# Patient Record
Sex: FEMALE | Race: WHITE | Employment: FULL TIME | URBAN - METROPOLITAN AREA
[De-identification: names, ages, dates, MRNs, and addresses within clinical notes are randomized per-mention and may not be internally consistent; named-entity substitution may affect disease eponyms.]

---

## 2017-02-17 ENCOUNTER — TELEPHONE (OUTPATIENT)
Dept: INTERNAL MEDICINE CLINIC | Age: 62
End: 2017-02-17

## 2017-02-23 RX ORDER — HYDROGEN PEROXIDE 3 %
20 SOLUTION, NON-ORAL MISCELLANEOUS DAILY
Qty: 90 CAP | Refills: 0 | Status: SHIPPED | OUTPATIENT
Start: 2017-02-23 | End: 2017-03-30 | Stop reason: SDUPTHER

## 2017-03-09 ENCOUNTER — OFFICE VISIT (OUTPATIENT)
Dept: INTERNAL MEDICINE CLINIC | Age: 62
End: 2017-03-09

## 2017-03-09 VITALS
OXYGEN SATURATION: 97 % | BODY MASS INDEX: 33.47 KG/M2 | HEART RATE: 67 BPM | SYSTOLIC BLOOD PRESSURE: 129 MMHG | HEIGHT: 69 IN | WEIGHT: 226 LBS | DIASTOLIC BLOOD PRESSURE: 76 MMHG | RESPIRATION RATE: 18 BRPM | TEMPERATURE: 97.8 F

## 2017-03-09 DIAGNOSIS — E55.9 VITAMIN D DEFICIENCY: ICD-10-CM

## 2017-03-09 DIAGNOSIS — B00.1 HERPES LABIALIS: ICD-10-CM

## 2017-03-09 DIAGNOSIS — E78.2 MIXED HYPERLIPIDEMIA: ICD-10-CM

## 2017-03-09 DIAGNOSIS — K21.9 GASTROESOPHAGEAL REFLUX DISEASE WITHOUT ESOPHAGITIS: ICD-10-CM

## 2017-03-09 DIAGNOSIS — Z00.00 WELLNESS EXAMINATION: Primary | ICD-10-CM

## 2017-03-09 RX ORDER — VALACYCLOVIR HYDROCHLORIDE 1 G/1
1000 TABLET, FILM COATED ORAL 2 TIMES DAILY
Qty: 2 TAB | Refills: 5 | Status: SHIPPED | OUTPATIENT
Start: 2017-03-09 | End: 2017-03-09 | Stop reason: ALTCHOICE

## 2017-03-09 RX ORDER — VALACYCLOVIR HYDROCHLORIDE 1 G/1
2000 TABLET, FILM COATED ORAL 2 TIMES DAILY
Qty: 4 TAB | Refills: 6 | Status: SHIPPED | OUTPATIENT
Start: 2017-03-09 | End: 2017-03-10

## 2017-03-09 RX ORDER — ROSUVASTATIN CALCIUM 10 MG/1
10 TABLET, COATED ORAL
Qty: 90 TAB | Refills: 2 | Status: SHIPPED | OUTPATIENT
Start: 2017-03-09 | End: 2017-10-12 | Stop reason: SDUPTHER

## 2017-03-09 NOTE — MR AVS SNAPSHOT
Visit Information Date & Time Provider Department Dept. Phone Encounter #  
 3/9/2017  3:30 PM Clementine Fry MD [facility] [address] [id number] Follow-up Instructions Return in about 6 months (around 9/9/2017). Upcoming Health Maintenance Date Due Hepatitis C Screening 1955 PAP AKA CERVICAL CYTOLOGY 5/22/1976 INFLUENZA AGE 9 TO ADULT 8/1/2016 BREAST CANCER SCRN MAMMOGRAM 12/21/2018 COLONOSCOPY 11/15/2020 DTaP/Tdap/Td series (2 - Td) 6/2/2021 Allergies as of 3/9/2017  Review Complete On: 3/9/2017 By: Remi Kendrick LPN No Known Allergies Current Immunizations  Never Reviewed Name Date Influenza Vaccine 10/1/2015 TDAP Vaccine 6/2/2011 Zoster Vaccine, Live 2/3/2016 Not reviewed this visit You Were Diagnosed With   
  
 Codes Comments Wellness examination    -  Primary ICD-10-CM: Z00.00 ICD-9-CM: V70.0 Mixed hyperlipidemia     ICD-10-CM: E78.2 ICD-9-CM: 272.2 Herpes labialis     ICD-10-CM: B00.1 ICD-9-CM: 054.9 Vitamin D deficiency     ICD-10-CM: E55.9 ICD-9-CM: 268.9 Gastroesophageal reflux disease without esophagitis     ICD-10-CM: K21.9 ICD-9-CM: 530.81 Vitals BP Pulse Temp Resp Height(growth percentile) Weight(growth percentile) 129/76 67 97.8 °F (36.6 °C) (Oral) 18 5' 9\" (1.753 m) 226 lb (102.5 kg) SpO2 BMI OB Status Smoking Status 97% 33.37 kg/m2 Premenopausal Never Smoker Vitals History BMI and BSA Data Body Mass Index Body Surface Area  
 33.37 kg/m 2 2.23 m 2 Preferred Pharmacy Pharmacy Name Phone [facility], [address] [phone] Your Updated Medication List  
  
   
This list is accurate as of: 3/9/17  4:12 PM.  Always use your most recent med list.  
  
  
  
  
 aspirin 81 mg tablet Take  by mouth. esomeprazole 20 mg capsule Commonly known as:  NexIUM Take 1 Cap by mouth daily. Appointment needed for additional refills FLONASE 50 mcg/actuation nasal spray Generic drug:  fluticasone 2 Sprays by Both Nostrils route daily. GLUCOSAMINE PO Take  by mouth daily. loratadine 10 mg tablet Commonly known as:  Reyes Olvin Take 10 mg by mouth. MULTIVITAMIN PO Take  by mouth. psyllium packet Commonly known as:  METAMUCIL Take 1 Packet by mouth daily. rosuvastatin 10 mg tablet Commonly known as:  CRESTOR Take 1 Tab by mouth nightly. valACYclovir 1 gram tablet Commonly known as:  VALTREX Take 2 Tabs by mouth two (2) times a day for 1 day. VITAMIN D3 1,000 unit tablet Generic drug:  cholecalciferol Take  by mouth daily. Prescriptions Sent to Pharmacy Refills  
 rosuvastatin (CRESTOR) 10 mg tablet 2 Sig: Take 1 Tab by mouth nightly. Class: Normal  
 Pharmacy: [place] [id number] Ph #: [phone] Route: Oral  
 valACYclovir (VALTREX) 1 gram tablet 6 Sig: Take 2 Tabs by mouth two (2) times a day for 1 day. Class: Normal  
 Pharmacy: [place] [id number] Ph #: [phone] Route: Oral  
  
Follow-up Instructions Return in about 6 months (around 9/9/2017). Introducing [facility] & [facility] SERVICES! Dear Kathy Moses: Thank you for requesting a [employer] account. Our records indicate that you already have an active [employer]Fin account. You can access your account anytime at [email or web address] Did you know that you can access your hospital and ER discharge instructions at any time in [employer]Fin? You can also review all of your test results from your hospital stay or ER visit. Additional Information If you have questions, please visit the Frequently Asked Questions section of the [employer] website at [email or web address] Remember, [facility] is NOT to be used for urgent needs. For medical emergencies, dial 911. Now available from your iPhone and Android! Please provide this summary of care documentation to your next provider. Your primary care clinician is listed as [facility]. If you have any questions after today's visit, please call [phone].

## 2017-03-09 NOTE — PROGRESS NOTES
Dominguez Tang is a 64 y.o. female who presents today for Medication Evaluation (f/u for Crestor)  . She has a history of   Patient Active Problem List   Diagnosis Code    Vitamin D deficiency E55.9    HLD (hyperlipidemia) E78.5    Family history of bicuspid aortic valve Z82.79    Gastroesophageal reflux disease without esophagitis K21.9   . Today patient is here for follow up. she does not have other concerns. Hyperlipidemia - Stable. Will repeat this. Weight up a few #'s. Currently she takes 10 mg of Crestor. ROS: taking medications as instructed, no medication side effects noted  No new myalgias, no joint pains, no weakness  No TIA's, no chest pain on exertion, no dyspnea on exertion, no swelling of ankles. Lab Results   Component Value Date/Time    Cholesterol, total 205 03/31/2016 08:38 AM    HDL Cholesterol 51 03/31/2016 08:38 AM    LDL,Direct 206 05/02/2015    LDL, calculated 120 03/31/2016 08:38 AM    VLDL, calculated 34 03/31/2016 08:38 AM    Triglyceride 170 03/31/2016 08:38 AM    Triglycerides 361 05/02/2015     GERD: Doing well with lower dose Nexium. Health maintenance hx includes:  Exercise: moderately active. Form of exercise: walking. 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 Screening:    Colon cancer screening:  Last Colonoscopy: 2010 and   was normal   Breast cancer screening: last mammogram 2016 and   was normal   Cervical cancer screening: last PAP/Pelvic exam: 2016   and was normal.   Osteoporosis screening:  N/A      Immunizations:     Immunization History   Administered Date(s) Administered    Influenza Vaccine 10/01/2015    TDAP Vaccine 06/02/2011    Zoster Vaccine, Live 02/03/2016      Immunization status: up to date and documented. ROS  Review of Systems   Constitutional: Negative for chills, fever and weight loss. HENT: Negative for congestion and sore throat.     Eyes: Negative for blurred vision, double vision and photophobia. Respiratory: Negative for cough and shortness of breath. Cardiovascular: Negative for chest pain, palpitations and leg swelling. Gastrointestinal: Positive for constipation and diarrhea. Negative for abdominal pain, blood in stool, heartburn, melena, nausea and vomiting. Genitourinary: Negative for dysuria, frequency and urgency. Musculoskeletal: Negative for joint pain and myalgias. Skin: Negative for rash. Neurological: Negative. Negative for headaches. Endo/Heme/Allergies: Does not bruise/bleed easily. Psychiatric/Behavioral: Negative for memory loss and suicidal ideas. Visit Vitals    /76    Pulse 67    Temp 97.8 °F (36.6 °C) (Oral)    Resp 18    Ht 5' 9\" (1.753 m)    Wt 226 lb (102.5 kg)    SpO2 97%    BMI 33.37 kg/m2       Physical Exam   Constitutional: She is oriented to person, place, and time. She appears well-developed and well-nourished. No distress. HENT:   Head: Normocephalic and atraumatic. Neck: Normal range of motion. Neck supple. No thyromegaly present. Cardiovascular: Normal rate and regular rhythm. No murmur heard. Pulmonary/Chest: Effort normal and breath sounds normal. She has no wheezes. Abdominal: Soft. Bowel sounds are normal. She exhibits no distension. Musculoskeletal: Normal range of motion. She exhibits no edema. Neurological: She is alert and oriented to person, place, and time. No cranial nerve deficit. Skin: Skin is warm and dry. Psychiatric: She has a normal mood and affect. Her behavior is normal.         Current Outpatient Prescriptions   Medication Sig    psyllium (METAMUCIL) packet Take 1 Packet by mouth daily.  rosuvastatin (CRESTOR) 10 mg tablet Take 1 Tab by mouth nightly.  valACYclovir (VALTREX) 1 gram tablet Take 2 Tabs by mouth two (2) times a day for 1 day.  esomeprazole (NEXIUM) 20 mg capsule Take 1 Cap by mouth daily.  Appointment needed for additional refills    loratadine (CLARITIN) 10 mg tablet Take 10 mg by mouth.  cholecalciferol, vitamin d3, (VITAMIN D) 1,000 unit tablet Take  by mouth daily.  aspirin 81 mg tablet Take  by mouth.  GLUCOSAMINE SULFATE (GLUCOSAMINE PO) Take  by mouth daily.  MULTIVITAMIN PO Take  by mouth.  fluticasone (FLONASE) 50 mcg/actuation nasal spray 2 Sprays by Both Nostrils route daily. No current facility-administered medications for this visit. Past Medical History:   Diagnosis Date    AR (allergic rhinitis)     HLD (hyperlipidemia)     Post-menopausal AGE 48    Schatzki's ring     Vitamin D deficiency       Past Surgical History:   Procedure Laterality Date    COLONOSCOPY  11/15/10    small EH, repeat 10 years; dr Maritza Juárez EGD  11/15/10    stricture ge junction (dilated), small hh       Social History   Substance Use Topics    Smoking status: Never Smoker    Smokeless tobacco: Never Used    Alcohol use 3.5 oz/week     7 Glasses of wine per week      Family History   Problem Relation Age of Onset    Diabetes Mother     Elevated Lipids Mother     Hypertension Mother    [address] Elevated Lipids Sister     Hypertension Brother     Hypertension Maternal Grandfather     Heart Disease Maternal Grandfather     Cancer Paternal Grandmother      stomach    Alzheimer Father     Alzheimer Maternal Grandmother         No Known Allergies     Assessment/Plan  Christelle Gonzalez was seen today for medication evaluation. Diagnoses and all orders for this visit:    Wellness examination -  UTD. Pt gets GYN care elsewhere. Discussed working some weight loss. Continue exercise. Michele Medina was counseled on age-appropriate/ guideline-based risk prevention behaviors and screening for a 64y.o. year old   female . We also discussed adjustments in screening based on family history if necessary.    Printed instructions for preventative screening guidelines and healthy behaviors given to patient with after visit summary. Mixed hyperlipidemia - refill  -     rosuvastatin (CRESTOR) 10 mg tablet; Take 1 Tab by mouth nightly. Herpes labialis - cold sores. Start valtrex 2g BIX for 2 doses at onset. -     Discontinue: valACYclovir (VALTREX) 1 gram tablet; Take 1 Tab by mouth two (2) times a day for 1 day. -     valACYclovir (VALTREX) 1 gram tablet; Take 2 Tabs by mouth two (2) times a day for 1 day. Vitamin D deficiency - Repeat     Gastroesophageal reflux disease without esophagitis - Stable. SOme abdomnial discomfort still. Discussed fiber and probiotics. Follow-up Disposition:  Return in about 6 months (around 9/9/2017).     Calixto Simmons MD  3/9/2017

## 2017-03-30 ENCOUNTER — OFFICE VISIT (OUTPATIENT)
Dept: INTERNAL MEDICINE CLINIC | Age: 62
End: 2017-03-30

## 2017-03-30 VITALS
DIASTOLIC BLOOD PRESSURE: 85 MMHG | BODY MASS INDEX: 34.21 KG/M2 | SYSTOLIC BLOOD PRESSURE: 138 MMHG | TEMPERATURE: 98.1 F | OXYGEN SATURATION: 97 % | HEIGHT: 69 IN | RESPIRATION RATE: 18 BRPM | HEART RATE: 67 BPM | WEIGHT: 231 LBS

## 2017-03-30 DIAGNOSIS — H92.01 EAR PAIN, RIGHT: Primary | ICD-10-CM

## 2017-03-30 DIAGNOSIS — K21.9 GASTROESOPHAGEAL REFLUX DISEASE WITHOUT ESOPHAGITIS: ICD-10-CM

## 2017-03-30 RX ORDER — HYDROGEN PEROXIDE 3 %
20 SOLUTION, NON-ORAL MISCELLANEOUS DAILY
Qty: 90 CAP | Refills: 1 | Status: SHIPPED | OUTPATIENT
Start: 2017-03-30 | End: 2017-10-12 | Stop reason: SDUPTHER

## 2017-03-30 RX ORDER — LORATADINE AND PSEUDOEPHEDRINE 10; 240 MG/1; MG/1
1 TABLET, EXTENDED RELEASE ORAL DAILY
Qty: 7 TAB | Refills: 0 | Status: SHIPPED | OUTPATIENT
Start: 2017-03-30 | End: 2017-04-06

## 2017-03-30 NOTE — PROGRESS NOTES
Conner Bravo is a 64 y.o. female who presents today for Ear Pain (rt ear; x 4 days)  . She has a history of   Patient Active Problem List   Diagnosis Code    Vitamin D deficiency E55.9    HLD (hyperlipidemia) E78.5    Family history of bicuspid aortic valve Z82.79    Gastroesophageal reflux disease without esophagitis K21.9   . Today patient is here for an acute visit. Upper respiratory illness:  Conner Bravo presents with complaints of R ear pain and mild throat pain for 4 days. no nausea and no vomiting . Seemed to be getting better, but waking her up at night. Symptoms are moderate. Over-the-counter remedies including None   No changes in hearing. Some mild allergies. No s/s of URI. ROS  Review of Systems   Constitutional: Negative for chills, fever and weight loss. HENT: Positive for ear pain. Negative for congestion, ear discharge, hearing loss, nosebleeds, sore throat and tinnitus. Eyes: Negative for blurred vision, double vision and photophobia. Respiratory: Negative for cough, shortness of breath and stridor. Cardiovascular: Negative for chest pain, palpitations, orthopnea, claudication and leg swelling. Gastrointestinal: Negative for abdominal pain, constipation, diarrhea, heartburn, nausea and vomiting. Genitourinary: Negative for dysuria, frequency and urgency. Musculoskeletal: Negative for joint pain and myalgias. Skin: Negative for itching and rash. Neurological: Negative. Negative for headaches. Endo/Heme/Allergies: Does not bruise/bleed easily. Psychiatric/Behavioral: Negative for memory loss and suicidal ideas. Visit Vitals    /85    Pulse 67    Temp 98.1 °F (36.7 °C) (Oral)    Resp 18    Ht 5' 9\" (1.753 m)    Wt 231 lb (104.8 kg)    SpO2 97%    BMI 34.11 kg/m2       Physical Exam   Constitutional: She appears well-developed and well-nourished. HENT:   Head: Normocephalic and atraumatic.    Right Ear: External ear normal. Left Ear: External ear normal.   Mouth/Throat: Oropharynx is clear and moist. No oropharyngeal exudate. Fluid behind R TM, no pus. Ear canal normal   Cardiovascular: Normal rate and regular rhythm. Pulmonary/Chest: Effort normal. No respiratory distress. Abdominal: Soft. Bowel sounds are normal. She exhibits no distension. No umbilical hernia noted   Lymphadenopathy:     She has no cervical adenopathy. Skin: Skin is warm and dry. Psychiatric: She has a normal mood and affect. Her behavior is normal.         Current Outpatient Prescriptions   Medication Sig    loratadine-pseudoephedrine (CLARITIN-D 24 HOUR)  mg per tablet Take 1 Tab by mouth daily for 7 days.  esomeprazole (NEXIUM) 20 mg capsule Take 1 Cap by mouth daily. Appointment needed for additional refills    psyllium (METAMUCIL) packet Take 1 Packet by mouth daily.  rosuvastatin (CRESTOR) 10 mg tablet Take 1 Tab by mouth nightly.  loratadine (CLARITIN) 10 mg tablet Take 10 mg by mouth.  cholecalciferol, vitamin d3, (VITAMIN D) 1,000 unit tablet Take  by mouth daily.  aspirin 81 mg tablet Take  by mouth.  GLUCOSAMINE SULFATE (GLUCOSAMINE PO) Take  by mouth daily.  MULTIVITAMIN PO Take  by mouth.  fluticasone (FLONASE) 50 mcg/actuation nasal spray 2 Sprays by Both Nostrils route daily. No current facility-administered medications for this visit.          Past Medical History:   Diagnosis Date    AR (allergic rhinitis)     HLD (hyperlipidemia)     Post-menopausal AGE 48    Schatzki's ring     Vitamin D deficiency       Past Surgical History:   Procedure Laterality Date    COLONOSCOPY  11/15/10    small EH, repeat 10 years; dr Maritza Juárez EGD  11/15/10    stricture ge junction (dilated), small hh       Social History   Substance Use Topics    Smoking status: Never Smoker    Smokeless tobacco: Never Used    Alcohol use 3.5 oz/week     7 Glasses of wine per week      Family History   Problem Relation Age of Onset    Diabetes Mother     Elevated Lipids Mother     Hypertension Mother    Aundria Dadds Elevated Lipids Sister     Hypertension Brother     Hypertension Maternal Grandfather     Heart Disease Maternal Grandfather     Cancer Paternal Grandmother      stomach    Alzheimer Father     Alzheimer Maternal Grandmother         No Known Allergies     Assessment/Plan  Omar Mar was seen today for ear pain. Diagnoses and all orders for this visit:    Ear pain, right - + fluid, no pus. Try decongestant for several days. If no better will treat with amoxicillin. -     loratadine-pseudoephedrine (CLARITIN-D 24 HOUR)  mg per tablet; Take 1 Tab by mouth daily for 7 days. Gastroesophageal reflux disease without esophagitis - refill  -     esomeprazole (NEXIUM) 20 mg capsule; Take 1 Cap by mouth daily. Appointment needed for additional refills  Do not feel an umbilical hernia.        Follow-up Disposition: Not on File    Wendi Escoto MD  3/30/2017

## 2017-03-30 NOTE — MR AVS SNAPSHOT
Visit Information Date & Time Provider Department Dept. Phone Encounter #  
 3/30/2017 11:30 AM Fritz Cortes MD [facility] [address] [id number] Upcoming Health Maintenance Date Due Hepatitis C Screening 1955 PAP AKA CERVICAL CYTOLOGY 5/22/1976 INFLUENZA AGE 9 TO ADULT 8/1/2016 BREAST CANCER SCRN MAMMOGRAM 12/21/2018 COLONOSCOPY 11/15/2020 DTaP/Tdap/Td series (2 - Td) 6/2/2021 Allergies as of 3/30/2017  Review Complete On: 3/9/2017 By: Elizabeth Esposito LPN No Known Allergies Current Immunizations  Never Reviewed Name Date Influenza Vaccine 10/1/2015 TDAP Vaccine 6/2/2011 Zoster Vaccine, Live 2/3/2016 Not reviewed this visit You Were Diagnosed With   
  
 Codes Comments Referred ear pain, right    -  Primary ICD-10-CM: H92.01 
ICD-9-CM: 388.72 Gastroesophageal reflux disease without esophagitis     ICD-10-CM: K21.9 ICD-9-CM: 530.81 Vitals BP Pulse Temp Resp Height(growth percentile) Weight(growth percentile) 138/85 67 98.1 °F (36.7 °C) (Oral) 18 5' 9\" (1.753 m) 231 lb (104.8 kg) SpO2 BMI OB Status Smoking Status 97% 34.11 kg/m2 Premenopausal Never Smoker Vitals History BMI and BSA Data Body Mass Index Body Surface Area  
 34.11 kg/m 2 2.26 m 2 Preferred Pharmacy Pharmacy Name Phone [facility], [address] [phone] Your Updated Medication List  
  
   
This list is accurate as of: 3/30/17 12:11 PM.  Always use your most recent med list.  
  
  
  
  
 aspirin 81 mg tablet Take  by mouth.  
  
 esomeprazole 20 mg capsule Commonly known as:  NexIUM Take 1 Cap by mouth daily. Appointment needed for additional refills FLONASE 50 mcg/actuation nasal spray Generic drug:  fluticasone 2 Sprays by Both Nostrils route daily.   
  
 GLUCOSAMINE PO  
 Take  by mouth daily. loratadine 10 mg tablet Commonly known as:  Diaz Tirso Take 10 mg by mouth.  
  
 loratadine-pseudoephedrine  mg per tablet Commonly known as:  CLARITIN-D 24 HOUR Take 1 Tab by mouth daily for 7 days. MULTIVITAMIN PO Take  by mouth. psyllium packet Commonly known as:  METAMUCIL Take 1 Packet by mouth daily. rosuvastatin 10 mg tablet Commonly known as:  CRESTOR Take 1 Tab by mouth nightly. VITAMIN D3 1,000 unit tablet Generic drug:  cholecalciferol Take  by mouth daily. Prescriptions Sent to Pharmacy Refills  
 loratadine-pseudoephedrine (CLARITIN-D 24 HOUR)  mg per tablet 0 Sig: Take 1 Tab by mouth daily for 7 days. Class: Normal  
 Pharmacy: [place] [address] Ph #: [phone] Route: Oral  
 esomeprazole (NEXIUM) 20 mg capsule 1 Sig: Take 1 Cap by mouth daily. Appointment needed for additional refills Class: Normal  
 Pharmacy: [place] [id number] Ph #: [phone] Route: Oral  
  
Introducing [facility] & [facility]! Dear Danielle Rm: Thank you for requesting a [email or web address] account. Our records indicate that you already have an active [email or web address] account. You can access your account anytime at [email or web address] Did you know that you can access your hospital and ER discharge instructions at any time in [email or web address]? You can also review all of your test results from your hospital stay or ER visit. Additional Information If you have questions, please visit the Frequently Asked Questions section of the [email or web address] website at [email or web address] Remember, [email or web address] is NOT to be used for urgent needs. For medical emergencies, dial 911. Now available from your iPhone and Android! Please provide this summary of care documentation to your next provider. Your primary care clinician is listed as Thom Cruz. If you have any questions after today's visit, please call [phone].

## 2017-10-12 ENCOUNTER — OFFICE VISIT (OUTPATIENT)
Dept: INTERNAL MEDICINE CLINIC | Age: 62
End: 2017-10-12

## 2017-10-12 VITALS
DIASTOLIC BLOOD PRESSURE: 89 MMHG | WEIGHT: 227 LBS | BODY MASS INDEX: 33.62 KG/M2 | SYSTOLIC BLOOD PRESSURE: 136 MMHG | TEMPERATURE: 98.2 F | HEART RATE: 70 BPM | HEIGHT: 69 IN | OXYGEN SATURATION: 96 %

## 2017-10-12 DIAGNOSIS — E78.2 MIXED HYPERLIPIDEMIA: Primary | ICD-10-CM

## 2017-10-12 DIAGNOSIS — K21.9 GASTROESOPHAGEAL REFLUX DISEASE WITHOUT ESOPHAGITIS: ICD-10-CM

## 2017-10-12 DIAGNOSIS — G56.02 CARPAL TUNNEL SYNDROME OF LEFT WRIST: ICD-10-CM

## 2017-10-12 DIAGNOSIS — E55.9 VITAMIN D DEFICIENCY: ICD-10-CM

## 2017-10-12 RX ORDER — ROSUVASTATIN CALCIUM 10 MG/1
10 TABLET, COATED ORAL
Qty: 90 TAB | Refills: 3 | Status: SHIPPED | OUTPATIENT
Start: 2017-10-12

## 2017-10-12 RX ORDER — HYDROGEN PEROXIDE 3 %
20 SOLUTION, NON-ORAL MISCELLANEOUS DAILY
Qty: 90 CAP | Refills: 3 | Status: SHIPPED | OUTPATIENT
Start: 2017-10-12 | End: 2018-03-26 | Stop reason: DRUGHIGH

## 2017-10-12 NOTE — PATIENT INSTRUCTIONS
Carpal Tunnel Syndrome: Care Instructions  Your Care Instructions    Carpal tunnel syndrome is a nerve problem. It can cause tingling, numbness, weakness, or pain in the fingers, thumb, and hand. The median nerve and several tough tissues called tendons run through a space in the wrist called the carpal tunnel. The repeated hand motions used in work and some hobbies and sports can put pressure on the nerve. Pregnancy and several conditions, including diabetes, arthritis, and an underactive thyroid, also can cause carpal tunnel syndrome. You may be able to limit an activity or do it differently to reduce your symptoms. You also can take other steps to feel better. If your symptoms are mild, 1 to 2 weeks of home treatment are likely to ease your pain. Surgery is needed only if other treatments do not work. Follow-up care is a key part of your treatment and safety. Be sure to make and go to all appointments, and call your doctor if you are having problems. It's also a good idea to know your test results and keep a list of the medicines you take. How can you care for yourself at home? · If possible, stop or reduce the activity that causes your symptoms. If you cannot stop the activity, take frequent breaks to rest and stretch or change hand positions to do a task. Try switching hands, such as when using a computer mouse. · Try to avoid bending or twisting your wrists. · Ask your doctor if you can take an over-the-counter pain medicine, such as acetaminophen (Tylenol), ibuprofen (Advil, Motrin), or naproxen (Aleve). Be safe with medicines. Read and follow all instructions on the label. · If your doctor prescribes corticosteroid medicine to help reduce pain and swelling, take it exactly as prescribed. Call your doctor if you think you are having a problem with your medicine. · Put ice or a cold pack on your wrist for 10 to 20 minutes at a time to ease pain.  Put a thin cloth between the ice and your skin.  · If your doctor or your physical or occupational therapist tells you to wear a wrist splint, wear it as directed to keep your wrist in a neutral position. This also eases pressure on your median nerve. · Ask your doctor whether you should have physical or occupational therapy to learn how to do tasks differently. · Try a yoga class to stretch your muscles and build strength in your hands and wrists. Yoga has been shown to ease carpal tunnel symptoms. To prevent carpal tunnel  · When working at a computer, keep your hands and wrists in line with your forearms. Hold your elbows close to your sides. Take a break every 10 to 15 minutes. · Try these exercises:  ¨ Warm up: Rotate your wrist up, down, and from side to side. Repeat this 4 times. Stretch your fingers far apart, relax them, then stretch them again. Repeat 4 times. Stretch your thumb by pulling it back gently, holding it, and then releasing it. Repeat 4 times. ¨ Prayer stretch: Start with your palms together in front of your chest just below your chin. Slowly lower your hands toward your waistline while keeping your hands close to your stomach and your palms together until you feel a mild to moderate stretch under your forearms. Hold for 10 to 20 seconds. Repeat 4 times. ¨ Wrist flexor stretch: Hold your arm in front of you with your palm up. Bend your wrist, pointing your hand toward the floor. With your other hand, gently bend your wrist further until you feel a mild to moderate stretch in your forearm. Hold for 10 to 20 seconds. Repeat 4 times. ¨ Wrist extensor stretch: Repeat the steps for the wrist flexor stretch, but begin with your extended hand palm down. · Squeeze a rubber exercise ball several times a day to keep your hands and fingers strong. · Avoid holding objects (such as a book) in one position for a long time. When possible, use your whole hand to grasp an object.  Using just the thumb and index finger can put stress on the wrist.  · Do not smoke. It can make this condition worse by reducing blood flow to the median nerve. If you need help quitting, talk to your doctor about stop-smoking programs and medicines. These can increase your chances of quitting for good. When should you call for help? Watch closely for changes in your health, and be sure to contact your doctor if:  · Your pain or other problems do not get better with home care. · You want more information about physical or occupational therapy. · You have side effects of your corticosteroid medicine, such as:  ¨ Weight gain. ¨ Mood changes. ¨ Trouble sleeping. ¨ Bruising easily. · You have any other problems with your medicine. Where can you learn more? Go to http://gamal-tri.info/. Enter R432 in the search box to learn more about \"Carpal Tunnel Syndrome: Care Instructions. \"  Current as of: March 21, 2017  Content Version: 11.3  © 6718-0604 [employer]. Care instructions adapted under license by [employer] (which disclaims liability or warranty for this information). If you have questions about a medical condition or this instruction, always ask your healthcare professional. [place] [address] any warranty or liability for your use of this information.

## 2017-10-12 NOTE — MR AVS SNAPSHOT
Visit Information Date & Time Provider Department Dept. Phone Encounter #  
 10/12/2017  3:30 PM Chio Man MD [facility] [address] [id number] Follow-up Instructions Return in about 6 months (around 4/12/2018) for physical.  
  
Upcoming Health Maintenance Date Due Hepatitis C Screening 1955 PAP AKA CERVICAL CYTOLOGY 5/22/1976 BREAST CANCER SCRN MAMMOGRAM 12/21/2018 COLONOSCOPY 11/15/2020 DTaP/Tdap/Td series (2 - Td) 6/2/2021 Allergies as of 10/12/2017  Review Complete On: 10/12/2017 By: Arsenio Toscano LPN No Known Allergies Current Immunizations  Never Reviewed Name Date Influenza Vaccine 10/1/2015 TDAP Vaccine 6/2/2011 Zoster Vaccine, Live 2/3/2016 Not reviewed this visit You Were Diagnosed With   
  
 Codes Comments Mixed hyperlipidemia    -  Primary ICD-10-CM: R43.5 ICD-9-CM: 272.2 Carpal tunnel syndrome of left wrist     ICD-10-CM: G56.02 
ICD-9-CM: 354.0 Vitamin D deficiency     ICD-10-CM: E55.9 ICD-9-CM: 268.9 Gastroesophageal reflux disease without esophagitis     ICD-10-CM: K21.9 ICD-9-CM: 530.81 Vitals BP Pulse Temp Height(growth percentile) Weight(growth percentile) SpO2  
 136/89 (BP 1 Location: Right arm, BP Patient Position: Sitting) 70 98.2 °F (36.8 °C) (Oral) 5' 9\" (1.753 m) 227 lb (103 kg) 96% BMI OB Status Smoking Status 33.52 kg/m2 Postmenopausal Never Smoker Vitals History BMI and BSA Data Body Mass Index Body Surface Area  
 33.52 kg/m 2 2.24 m 2 Preferred Pharmacy Pharmacy Name Phone [facility] [place] [facility] [phone] Your Updated Medication List  
  
   
This list is accurate as of: 10/12/17  4:51 PM.  Always use your most recent med list.  
  
  
  
  
 aspirin 81 mg tablet Take  by mouth.  
  
 esomeprazole 20 mg capsule Commonly known as:  NexIUM Take 1 Cap by mouth daily. FLONASE 50 mcg/actuation nasal spray Generic drug:  fluticasone 2 Sprays by Both Nostrils route daily. GLUCOSAMINE PO Take  by mouth daily. loratadine 10 mg tablet Commonly known as:  Jearline Sibley Take 10 mg by mouth. MULTIVITAMIN PO Take  by mouth. psyllium packet Commonly known as:  METAMUCIL Take 1 Packet by mouth daily. rosuvastatin 10 mg tablet Commonly known as:  CRESTOR Take 1 Tab by mouth nightly. VITAMIN D3 1,000 unit tablet Generic drug:  cholecalciferol Take  by mouth daily. Prescriptions Sent to Pharmacy Refills  
 rosuvastatin (CRESTOR) 10 mg tablet 3 Sig: Take 1 Tab by mouth nightly. Class: Normal  
 Pharmacy: [address] [place] [address] Ph #: [phone] Route: Oral  
 esomeprazole (NEXIUM) 20 mg capsule 3 Sig: Take 1 Cap by mouth daily. Class: Normal  
 Pharmacy: [address] [place] [address] Ph #: [phone] Route: Oral  
  
Follow-up Instructions Return in about 6 months (around 4/12/2018) for physical.  
  
To-Do List   
 Around 03/01/2018 Lab:  CBC WITH AUTOMATED DIFF Around 03/01/2018 Lab:  LIPID PANEL Around 03/01/2018 Lab:  METABOLIC PANEL, COMPREHENSIVE Around 03/01/2018 Lab:  VITAMIN D, 25 HYDROXY Patient Instructions Carpal Tunnel Syndrome: Care Instructions Your Care Instructions Carpal tunnel syndrome is a nerve problem. It can cause tingling, numbness, weakness, or pain in the fingers, thumb, and hand. The median nerve and several tough tissues called tendons run through a space in the wrist called the carpal tunnel. The repeated hand motions used in work and some hobbies and sports can put pressure on the nerve.  Pregnancy and several conditions, including diabetes, arthritis, and an underactive thyroid, also can cause carpal tunnel syndrome. You may be able to limit an activity or do it differently to reduce your symptoms. You also can take other steps to feel better. If your symptoms are mild, 1 to 2 weeks of home treatment are likely to ease your pain. Surgery is needed only if other treatments do not work. Follow-up care is a key part of your treatment and safety. Be sure to make and go to all appointments, and call your doctor if you are having problems. It's also a good idea to know your test results and keep a list of the medicines you take. How can you care for yourself at home? · If possible, stop or reduce the activity that causes your symptoms. If you cannot stop the activity, take frequent breaks to rest and stretch or change hand positions to do a task. Try switching hands, such as when using a computer mouse. · Try to avoid bending or twisting your wrists. · Ask your doctor if you can take an over-the-counter pain medicine, such as acetaminophen (Tylenol), ibuprofen (Advil, Motrin), or naproxen (Aleve). Be safe with medicines. Read and follow all instructions on the label. · If your doctor prescribes corticosteroid medicine to help reduce pain and swelling, take it exactly as prescribed. Call your doctor if you think you are having a problem with your medicine. · Put ice or a cold pack on your wrist for 10 to 20 minutes at a time to ease pain. Put a thin cloth between the ice and your skin. · If your doctor or your physical or occupational therapist tells you to wear a wrist splint, wear it as directed to keep your wrist in a neutral position. This also eases pressure on your median nerve. · Ask your doctor whether you should have physical or occupational therapy to learn how to do tasks differently. · Try a yoga class to stretch your muscles and build strength in your hands and wrists. Yoga has been shown to ease carpal tunnel symptoms. To prevent carpal tunnel · When working at a computer, keep your hands and wrists in line with your forearms. Hold your elbows close to your sides. Take a break every 10 to 15 minutes. · Try these exercises: ¨ Warm up: Rotate your wrist up, down, and from side to side. Repeat this 4 times. Stretch your fingers far apart, relax them, then stretch them again. Repeat 4 times. Stretch your thumb by pulling it back gently, holding it, and then releasing it. Repeat 4 times. ¨ Prayer stretch: Start with your palms together in front of your chest just below your chin. Slowly lower your hands toward your waistline while keeping your hands close to your stomach and your palms together until you feel a mild to moderate stretch under your forearms. Hold for 10 to 20 seconds. Repeat 4 times. ¨ Wrist flexor stretch: Hold your arm in front of you with your palm up. Bend your wrist, pointing your hand toward the floor. With your other hand, gently bend your wrist further until you feel a mild to moderate stretch in your forearm. Hold for 10 to 20 seconds. Repeat 4 times. ¨ Wrist extensor stretch: Repeat the steps for the wrist flexor stretch, but begin with your extended hand palm down. · Squeeze a rubber exercise ball several times a day to keep your hands and fingers strong. · Avoid holding objects (such as a book) in one position for a long time. When possible, use your whole hand to grasp an object. Using just the thumb and index finger can put stress on the wrist. 
· Do not smoke. It can make this condition worse by reducing blood flow to the median nerve. If you need help quitting, talk to your doctor about stop-smoking programs and medicines. These can increase your chances of quitting for good. When should you call for help? Watch closely for changes in your health, and be sure to contact your doctor if: 
· Your pain or other problems do not get better with home care. · You want more information about physical or occupational therapy. · You have side effects of your corticosteroid medicine, such as: 
¨ Weight gain. ¨ Mood changes. ¨ Trouble sleeping. ¨ Bruising easily. · You have any other problems with your medicine. Where can you learn more? Go to http://gamal-tri.info/. Enter R432 in the search box to learn more about \"Carpal Tunnel Syndrome: Care Instructions. \" Current as of: March 21, 2017 Content Version: 11.3 © 6673-1978 [employer]. Care instructions adapted under license by [employer] (which disclaims liability or warranty for this information). If you have questions about a medical condition or this instruction, always ask your healthcare professional. [place] [address] any warranty or liability for your use of this information. Introducing [facility] & HEALTH SERVICES! Dear Marii Quispe: Thank you for requesting a Micronotes account. Our records indicate that you already have an active Micronotes account. You can access your account anytime at [email or web address] Did you know that you can access your hospital and ER discharge instructions at any time in Micronotes? You can also review all of your test results from your hospital stay or ER visit. Additional Information If you have questions, please visit the Frequently Asked Questions section of the Micronotes website at [email or web address] Remember, Micronotes is NOT to be used for urgent needs. For medical emergencies, dial 911. Now available from your iPhone and Android! Please provide this summary of care documentation to your next provider. Your primary care clinician is listed as Red Ricci. If you have any questions after today's visit, please call [phone].

## 2017-10-12 NOTE — PROGRESS NOTES
Marisela Templeton is a 58 y.o. female who presents today for Follow-up and Cholesterol Problem  . She has a history of   Patient Active Problem List   Diagnosis Code    Vitamin D deficiency E55.9    HLD (hyperlipidemia) E78.5    Family history of bicuspid aortic valve Z82.79    Gastroesophageal reflux disease without esophagitis K21.9   . Today patient is here for f/u.   she does have other concerns. L carple tunnel is getting a bit worse. No limitations in daily activities, but acts up on occasions. Has had R surgically fixed in the past.     Hyperlipidemia - LDL at 110 on biometric screen. Weight is down 4 #. Currently she takes 10 mg of crestor  ROS: taking medications as instructed, no medication side effects noted  No new myalgias, no joint pains, no weakness  No TIA's, no chest pain on exertion, no dyspnea on exertion, no swelling of ankles. Lab Results   Component Value Date/Time    Cholesterol, total 202 03/14/2017 07:51 AM    HDL Cholesterol 50 03/14/2017 07:51 AM    LDL,Direct 206 05/02/2015    LDL, calculated 113 03/14/2017 07:51 AM    VLDL, calculated 39 03/14/2017 07:51 AM    Triglyceride 193 03/14/2017 07:51 AM         ROS  Review of Systems   Constitutional: Negative for chills, fever and weight loss. Eyes: Negative for blurred vision, double vision and photophobia. Respiratory: Negative for cough and shortness of breath. Cardiovascular: Negative for chest pain, palpitations, orthopnea, claudication and leg swelling. Gastrointestinal: Negative for abdominal pain, constipation, diarrhea, heartburn, nausea and vomiting. Genitourinary: Negative for dysuria, frequency and urgency. Neurological: Negative. Endo/Heme/Allergies: Does not bruise/bleed easily. Psychiatric/Behavioral: Negative for depression. The patient is not nervous/anxious.         Visit Vitals    /89 (BP 1 Location: Right arm, BP Patient Position: Sitting)    Pulse 70    Temp 98.2 °F (36.8 °C) (Oral)    Ht 5' 9\" (1.753 m)    Wt 227 lb (103 kg)    SpO2 96%    BMI 33.52 kg/m2       Physical Exam   Constitutional: She is oriented to person, place, and time. She appears well-developed and well-nourished. No distress. HENT:   Head: Normocephalic and atraumatic. Neck: Normal range of motion. Neck supple. No thyromegaly present. Cardiovascular: Normal rate and regular rhythm. No murmur heard. Pulmonary/Chest: Effort normal and breath sounds normal. No respiratory distress. She has no wheezes. Abdominal: Soft. Bowel sounds are normal. She exhibits no distension. Musculoskeletal: She exhibits no edema. FROM of L wrist   Neurological: She is alert and oriented to person, place, and time. No cranial nerve deficit. Skin: Skin is warm and dry. Psychiatric: She has a normal mood and affect. Her behavior is normal.         Current Outpatient Prescriptions   Medication Sig    rosuvastatin (CRESTOR) 10 mg tablet Take 1 Tab by mouth nightly.  esomeprazole (NEXIUM) 20 mg capsule Take 1 Cap by mouth daily.  psyllium (METAMUCIL) packet Take 1 Packet by mouth daily.  fluticasone (FLONASE) 50 mcg/actuation nasal spray 2 Sprays by Both Nostrils route daily.  loratadine (CLARITIN) 10 mg tablet Take 10 mg by mouth.  cholecalciferol, vitamin d3, (VITAMIN D) 1,000 unit tablet Take  by mouth daily.  aspirin 81 mg tablet Take  by mouth.  GLUCOSAMINE SULFATE (GLUCOSAMINE PO) Take  by mouth daily.  MULTIVITAMIN PO Take  by mouth. No current facility-administered medications for this visit.          Past Medical History:   Diagnosis Date    AR (allergic rhinitis)     HLD (hyperlipidemia)     Post-menopausal AGE 48    Schatzki's ring     Vitamin D deficiency       Past Surgical History:   Procedure Laterality Date    COLONOSCOPY  11/15/10    small EH, repeat 10 years; dr Skyler Thompson EGD  11/15/10    stricture ge junction (dilated), small hh       Social History   Substance Use Topics  Smoking status: Never Smoker    Smokeless tobacco: Never Used    Alcohol use 3.5 oz/week     7 Glasses of wine per week      Family History   Problem Relation Age of Onset    Diabetes Mother     Elevated Lipids Mother     Hypertension Mother    [facility] Elevated Lipids Sister     Hypertension Brother     Hypertension Maternal Grandfather     Heart Disease Maternal Grandfather     Cancer Paternal Grandmother      stomach    Alzheimer Father     Alzheimer Maternal Grandmother         No Known Allergies     Assessment/Plan  Diagnoses and all orders for this visit:    1. Mixed hyperlipidemia - stable. Continue current dose. -     METABOLIC PANEL, COMPREHENSIVE; Future  -     CBC WITH AUTOMATED DIFF; Future  -     LIPID PANEL; Future  -     rosuvastatin (CRESTOR) 10 mg tablet; Take 1 Tab by mouth nightly. 2. Carpal tunnel syndrome of left wrist - mid symptoms. Wrist splint QHS. 3. Vitamin D deficiency  -     VITAMIN D, 25 HYDROXY; Future    4. Gastroesophageal reflux disease without esophagitis  -     esomeprazole (NEXIUM) 20 mg capsule; Take 1 Cap by mouth daily.         Follow-up Disposition:  Return in about 6 months (around 4/12/2018) for physical.    Nikko Schaeffer MD  10/12/2017

## 2018-01-10 ENCOUNTER — HOSPITAL ENCOUNTER (OUTPATIENT)
Dept: MAMMOGRAPHY | Age: 63
Discharge: HOME | End: 2018-01-10
Attending: OBSTETRICS & GYNECOLOGY
Payer: COMMERCIAL

## 2018-01-10 DIAGNOSIS — Z12.39 BREAST SCREENING: ICD-10-CM

## 2018-01-10 PROCEDURE — 77067 SCR MAMMO BI INCL CAD: CPT

## 2018-01-24 ENCOUNTER — HOSPITAL ENCOUNTER (OUTPATIENT)
Dept: MAMMOGRAPHY | Age: 63
Discharge: HOME | End: 2018-01-24
Attending: OBSTETRICS & GYNECOLOGY
Payer: COMMERCIAL

## 2018-01-24 ENCOUNTER — TELEPHONE (OUTPATIENT)
Dept: INTERNAL MEDICINE CLINIC | Age: 63
End: 2018-01-24

## 2018-01-24 DIAGNOSIS — Z12.31 VISIT FOR SCREENING MAMMOGRAM: ICD-10-CM

## 2018-01-24 PROCEDURE — 77065 DX MAMMO INCL CAD UNI: CPT

## 2018-01-24 NOTE — TELEPHONE ENCOUNTER
[facility] states Pt needs a breast biopsy. Request that we call the patient first thing in the AM to schedule. Refugio Zepeda can be reached at [phone] with any further questions.

## 2018-01-25 ENCOUNTER — TELEPHONE (OUTPATIENT)
Dept: INTERNAL MEDICINE CLINIC | Age: 63
End: 2018-01-25

## 2018-01-25 DIAGNOSIS — R92.8 ABNORMAL MAMMOGRAM: Primary | ICD-10-CM

## 2018-01-25 NOTE — TELEPHONE ENCOUNTER
Biopsy has been scheduled for 10:40 AM on Monday, 1/29/18, [address] [facility], [address], [place], [address] [facility]. Pt to arrive at 10:20 AM.   Pt has been notified and verbalized understanding.

## 2018-01-25 NOTE — TELEPHONE ENCOUNTER
----- Message from Deidre Harp Page sent at 1/25/2018  8:35 AM EST -----  Regarding: Dr. Sheri Simon  Pt is requesting a return call, regarding a biopsy appt. Best contact number is [phone] or [phone].

## 2018-01-30 ENCOUNTER — OFFICE VISIT (OUTPATIENT)
Dept: INTERNAL MEDICINE CLINIC | Age: 63
End: 2018-01-30

## 2018-01-30 ENCOUNTER — TELEPHONE (OUTPATIENT)
Dept: INTERNAL MEDICINE CLINIC | Age: 63
End: 2018-01-30

## 2018-01-30 VITALS
TEMPERATURE: 98.4 F | HEIGHT: 69 IN | SYSTOLIC BLOOD PRESSURE: 138 MMHG | BODY MASS INDEX: 33.92 KG/M2 | HEART RATE: 68 BPM | DIASTOLIC BLOOD PRESSURE: 85 MMHG | WEIGHT: 229 LBS | RESPIRATION RATE: 16 BRPM | OXYGEN SATURATION: 93 %

## 2018-01-30 DIAGNOSIS — H92.01 RIGHT EAR PAIN: Primary | ICD-10-CM

## 2018-01-30 DIAGNOSIS — R92.8 ABNORMAL MAMMOGRAM: ICD-10-CM

## 2018-01-30 NOTE — MR AVS SNAPSHOT
[facility] 
 
 
 [address] [place] [address] 
[phone] Patient: Chica Brown MRN: [id number] WOD:6[birth year]00/[birth year]8 Visit Information Date & Time Provider Department Dept. Phone Encounter #  
 1/30/2018 10:00 AM Ana Murphy MD [facility] [phone] [id number] Your Appointments 2/5/2018  1:30 PM  
DR TAYLOR BX 15 with Samira Loyola MD  
[facility] ([facility]) Appt Note: us bx right/ dr Sadie Orellana ref/ imaging in pacs/ cp$30 1-25-18 LS; us bx right/ dr Sadie Orellana ref/ imaging in pacs/ cp$30 1-25-18 LS  
 [address] [facility] [address]  
  
   
 [place] [id number] [place] [address] [facility] Upcoming Health Maintenance Date Due Hepatitis C Screening 1955 PAP AKA CERVICAL CYTOLOGY 5/22/1976 BREAST CANCER SCRN MAMMOGRAM 1/24/2020 COLONOSCOPY 11/15/2020 DTaP/Tdap/Td series (2 - Td) 6/2/2021 Allergies as of 1/30/2018  Review Complete On: 3/35/9161 By: Otto Driver No Known Allergies Current Immunizations  Never Reviewed Name Date Influenza Vaccine 10/1/2015 TDAP Vaccine 6/2/2011 Zoster Vaccine, Live 2/3/2016 Not reviewed this visit You Were Diagnosed With   
  
 Codes Comments Right ear pain    -  Primary ICD-10-CM: H92.01 
ICD-9-CM: 388.70 Vitals BP Pulse Temp Resp Height(growth percentile) Weight(growth percentile) 138/85 (BP 1 Location: Left arm, BP Patient Position: Sitting) 68 98.4 °F (36.9 °C) (Oral) 16 5' 9\" (1.753 m) 229 lb (103.9 kg) SpO2 BMI OB Status Smoking Status 93% 33.82 kg/m2 Postmenopausal Never Smoker Vitals History BMI and BSA Data Body Mass Index Body Surface Area  
 33.82 kg/m 2 2.25 m 2 Preferred Pharmacy [facility] Name [facility] [address], [place] [phone] Your Updated Medication List  
  
   
This list is accurate as of: 1/30/18 10:56 AM.  Always use your most recent med list.  
  
  
  
  
 aspirin 81 mg tablet Take  by mouth. DEBROX 6.5 % otic solution Generic drug:  carbamide peroxide Administer 5 Drops into each ear two (2) times a day. esomeprazole 20 mg capsule Commonly known as:  NexIUM Take 1 Cap by mouth daily. FLONASE 50 mcg/actuation nasal spray Generic drug:  fluticasone 2 Sprays by Both Nostrils route daily. GLUCOSAMINE PO Take  by mouth daily. loratadine 10 mg tablet Commonly known as:  Virgilio Niurka Take 10 mg by mouth. MULTIVITAMIN PO Take  by mouth. psyllium packet Commonly known as:  METAMUCIL Take 1 Packet by mouth daily. rosuvastatin 10 mg tablet Commonly known as:  CRESTOR Take 1 Tab by mouth nightly. VITAMIN D3 1,000 unit tablet Generic drug:  cholecalciferol Take  by mouth daily. To-Do List   
 02/05/2018 2:00 PM  
(Arrive by 1:45 PM) Appointment with Karen Bridges MD; [facility] 1 at [address] [place] [address] ([phone]) Arrive 30 minutes prior to exam time (15 minutes if scheduled at [facility]). Wear 2 piece outfit. Do not wear deodorant. Allow 2 Hrs for procedure. If on blood thinners, check with physician BEFORE discontinuing. Bring Tylenol with you. Please bring someone with you if possible. ANY QUESTIONS, call Dept. [facility] [phone] [facility] [phone] [facility][note 2]9 [address] [phone] [facility] [phone] [address] [place] [address] Please arrive 15 minutes prior to appointment to register Patient Instructions Earache: Care Instructions Your Care Instructions Even though infection is a common cause of ear pain, not all ear pain means an infection. If you have ear pain and don't have an infection, it could be because of a jaw problem, such as temporomandibular joint (TMJ) pain. Or it could be because of a neck problem. When ear discomfort or pain is mild or comes and goes without other symptoms, home treatment may be all you need. Follow-up care is a key part of your treatment and safety. Be sure to make and go to all appointments, and call your doctor if you are having problems. It's also a good idea to know your test results and keep a list of the medicines you take. How can you care for yourself at home? · Apply heat on the ear to ease pain. To apply heat, put a warm water bottle, a heating pad set on low, or a warm cloth on your ear. Do not go to sleep with a heating pad on your skin. · Take an over-the-counter pain medicine, such as acetaminophen (Tylenol), ibuprofen (Advil, Motrin), or naproxen (Aleve). Be safe with medicines. Read and follow all instructions on the label. · Do not take two or more pain medicines at the same time unless the doctor told you to. Many pain medicines have acetaminophen, which is Tylenol. Too much acetaminophen (Tylenol) can be harmful. · Never insert anything, such as a cotton swab or a herminio pin, into the ear. When should you call for help? Call your doctor now or seek immediate medical care if: 
? · You have new or worse symptoms of infection, such as: 
¨ Increased pain, swelling, warmth, or redness. ¨ Red streaks leading from the area. ¨ Pus draining from the area. ¨ A fever. ? Watch closely for changes in your health, and be sure to contact your doctor if: 
? · You have new or worse discharge coming from the ear. ? · You do not get better as expected. Where can you learn more? Go to http://gamal-tri.info/. Enter S879 in the search box to learn more about \"Earache: Care Instructions. \" Current as of: May 12, 2017 Content Version: 11.4 © 6322-0928 [employer] Care instructions adapted under license by [employer] (which disclaims liability or warranty for this information).  If you have questions about a medical condition or this instruction, always ask your healthcare professional. [address] any warranty or liability for your use of this information. Introducing [facility] & HEALTH SERVICES! Dear Baron Lee: Thank you for requesting a Offerboxx account. Our records indicate that you already have an active Offerboxx account. You can access your account anytime at [email or web address] Did you know that you can access your hospital and ER discharge instructions at any time in Offerboxx? You can also review all of your test results from your hospital stay or ER visit. Additional Information If you have questions, please visit the Frequently Asked Questions section of the Offerboxx website at [email or web address] Remember, Offerboxx is NOT to be used for urgent needs. For medical emergencies, dial 911. Now available from your iPhone and Android! Please provide this summary of care documentation to your next provider. Your primary care clinician is listed as Aidan Rodrigues. If you have any questions after today's visit, please call [phone].

## 2018-01-30 NOTE — TELEPHONE ENCOUNTER
----- Message from Elaine Brothersite sent at 1/30/2018  7:54 AM EST -----  Regarding: Dr. Charline Becker  Pt stated she has an ear ache and has an appt for Wednesday but requested an appt for today. Best contact number [phone].

## 2018-01-30 NOTE — PROGRESS NOTES
Justo Crowe is a 58 y.o. female who presents today for Ear Pain (right ear)  . She has a history of   Patient Active Problem List   Diagnosis Code    Vitamin D deficiency E55.9    HLD (hyperlipidemia) E78.5    Family history of bicuspid aortic valve Z82.79    Gastroesophageal reflux disease without esophagitis K21.9   . Today patient is here for f/u. Having biopsy of breast on 2/5. Problem visit:  Justo Crowe is here for complaint of R ear pain. Problem began 1 day(s) ago. Severity is moderate  Character of problem: Sharp pain for a short period of time. Nothing makes the problem worse. Has resolved. Associated symptoms include: Mild congestion. No changes in hearing. Mild coughing. No Sick contacts. ROS  Review of Systems   Constitutional: Negative for chills, fever and weight loss. HENT: Positive for ear pain. Negative for congestion, ear discharge, hearing loss, nosebleeds and tinnitus. Respiratory: Negative for cough and shortness of breath. Cardiovascular: Negative for chest pain, palpitations and leg swelling. Gastrointestinal: Negative for abdominal pain, nausea and vomiting. Neurological: Negative. Endo/Heme/Allergies: Does not bruise/bleed easily. Psychiatric/Behavioral: Negative for depression. The patient is not nervous/anxious. Visit Vitals    /85 (BP 1 Location: Left arm, BP Patient Position: Sitting)    Pulse 68    Temp 98.4 °F (36.9 °C) (Oral)    Resp 16    Ht 5' 9\" (1.753 m)    Wt 229 lb (103.9 kg)    SpO2 93%    BMI 33.82 kg/m2       Physical Exam   Constitutional: She is oriented to person, place, and time. She appears well-developed and well-nourished. HENT:   Head: Normocephalic and atraumatic. Irritation of R ear canal. TM clear. Cardiovascular: Normal rate and regular rhythm. No murmur heard. Pulmonary/Chest: Effort normal. No respiratory distress.    Neurological: She is alert and oriented to person, place, and time. Skin: Skin is warm and dry. Psychiatric: She has a normal mood and affect. Her behavior is normal.         Current Outpatient Prescriptions   Medication Sig    carbamide peroxide (DEBROX) 6.5 % otic solution Administer 5 Drops into each ear two (2) times a day.  rosuvastatin (CRESTOR) 10 mg tablet Take 1 Tab by mouth nightly.  esomeprazole (NEXIUM) 20 mg capsule Take 1 Cap by mouth daily.  loratadine (CLARITIN) 10 mg tablet Take 10 mg by mouth.  cholecalciferol, vitamin d3, (VITAMIN D) 1,000 unit tablet Take  by mouth daily.  aspirin 81 mg tablet Take  by mouth.  GLUCOSAMINE SULFATE (GLUCOSAMINE PO) Take  by mouth daily.  MULTIVITAMIN PO Take  by mouth.  psyllium (METAMUCIL) packet Take 1 Packet by mouth daily.  fluticasone (FLONASE) 50 mcg/actuation nasal spray 2 Sprays by Both Nostrils route daily. No current facility-administered medications for this visit. Past Medical History:   Diagnosis Date    AR (allergic rhinitis)     HLD (hyperlipidemia)     Post-menopausal AGE 48    Schatzki's ring     Vitamin D deficiency       Past Surgical History:   Procedure Laterality Date    COLONOSCOPY  11/15/10    small EH, repeat 10 years; dr Cesia Loza EGD  11/15/10    stricture ge junction (dilated), small hh       Social History   Substance Use Topics    Smoking status: Never Smoker    Smokeless tobacco: Never Used    Alcohol use 3.5 oz/week     7 Glasses of wine per week      Family History   Problem Relation Age of Onset    Diabetes Mother     Elevated Lipids Mother     Hypertension Mother    [de-identified] Elevated Lipids Sister     Hypertension Brother     Hypertension Maternal Grandfather     Heart Disease Maternal Grandfather     Cancer Paternal Grandmother      stomach    Alzheimer Father     Alzheimer Maternal Grandmother         No Known Allergies     Assessment/Plan  Diagnoses and all orders for this visit:    1.  Right ear pain - mild ear canal irritation. No need for any treatment. She will be having a biopsy next week of her breast. She is quite concerned. Discussed that this is common and will hope for the best outcome.      Follow-up Disposition: Not on File    Nino Lynch MD  1/30/2018

## 2018-01-30 NOTE — PATIENT INSTRUCTIONS
Earache: Care Instructions  Your Care Instructions    Even though infection is a common cause of ear pain, not all ear pain means an infection. If you have ear pain and don't have an infection, it could be because of a jaw problem, such as temporomandibular joint (TMJ) pain. Or it could be because of a neck problem. When ear discomfort or pain is mild or comes and goes without other symptoms, home treatment may be all you need. Follow-up care is a key part of your treatment and safety. Be sure to make and go to all appointments, and call your doctor if you are having problems. It's also a good idea to know your test results and keep a list of the medicines you take. How can you care for yourself at home? · Apply heat on the ear to ease pain. To apply heat, put a warm water bottle, a heating pad set on low, or a warm cloth on your ear. Do not go to sleep with a heating pad on your skin. · Take an over-the-counter pain medicine, such as acetaminophen (Tylenol), ibuprofen (Advil, Motrin), or naproxen (Aleve). Be safe with medicines. Read and follow all instructions on the label. · Do not take two or more pain medicines at the same time unless the doctor told you to. Many pain medicines have acetaminophen, which is Tylenol. Too much acetaminophen (Tylenol) can be harmful. · Never insert anything, such as a cotton swab or a herminio pin, into the ear. When should you call for help? Call your doctor now or seek immediate medical care if:  ? · You have new or worse symptoms of infection, such as:  ¨ Increased pain, swelling, warmth, or redness. ¨ Red streaks leading from the area. ¨ Pus draining from the area. ¨ A fever. ? Watch closely for changes in your health, and be sure to contact your doctor if:  ? · You have new or worse discharge coming from the ear. ? · You do not get better as expected. Where can you learn more? Go to http://gamal-tri.info/.   Enter W662 in the search box to learn more about \"Earache: Care Instructions. \"  Current as of: May 12, 2017  Content Version: 11.4  © 8629-8527 [employer], [employer]. Care instructions adapted under license by [employer] (which disclaims liability or warranty for this information). If you have questions about a medical condition or this instruction, always ask your healthcare professional. [address] any warranty or liability for your use of this information.

## 2018-02-05 ENCOUNTER — OFFICE VISIT (OUTPATIENT)
Dept: SURGERY | Age: 63
End: 2018-02-05

## 2018-02-05 ENCOUNTER — HOSPITAL ENCOUNTER (OUTPATIENT)
Dept: MAMMOGRAPHY | Age: 63
Discharge: HOME | End: 2018-02-05
Attending: SURGERY
Payer: COMMERCIAL

## 2018-02-05 ENCOUNTER — HOSPITAL ENCOUNTER (OUTPATIENT)
Dept: LAB | Age: 63
Discharge: HOME | End: 2018-02-05

## 2018-02-05 ENCOUNTER — DOCUMENTATION ONLY (OUTPATIENT)
Dept: SURGERY | Age: 63
End: 2018-02-05

## 2018-02-05 VITALS
DIASTOLIC BLOOD PRESSURE: 89 MMHG | HEIGHT: 69 IN | SYSTOLIC BLOOD PRESSURE: 154 MMHG | HEART RATE: 84 BPM | WEIGHT: 229 LBS | BODY MASS INDEX: 33.92 KG/M2

## 2018-02-05 DIAGNOSIS — R92.8 ABNORMAL MAMMOGRAM: ICD-10-CM

## 2018-02-05 DIAGNOSIS — R92.8 ABNORMAL MAMMOGRAM OF RIGHT BREAST: Primary | ICD-10-CM

## 2018-02-05 DIAGNOSIS — R92.1 BREAST CALCIFICATION, RIGHT: ICD-10-CM

## 2018-02-05 DIAGNOSIS — R92.1 BREAST CALCIFICATION, RIGHT: Primary | ICD-10-CM

## 2018-02-05 PROCEDURE — 77065 DX MAMMO INCL CAD UNI: CPT

## 2018-02-05 PROCEDURE — 77030030538 MAM STEREO VAC  BX BREAST RT 1ST LESION W/CLIP AND SPECIMEN

## 2018-02-05 NOTE — PROGRESS NOTES
HISTORY OF PRESENT ILLNESS  Donna Mera is a 58 y.o. female. HPI  NEW patient consult referred by Dr. Chante Ugarte for RIGHT breast abnormal mammogram.  Denies palpable lump, skin changes, or nipple discharge/retraction. No pain. FH:  Denies FH of breast or ovarian cancer. Mammogram, 1/24/18, [facility] 4    Review of Systems   Constitutional: Negative. HENT: Negative. Eyes: Negative. Respiratory: Negative. Cardiovascular: Negative. Gastrointestinal: Negative. Genitourinary: Negative. Musculoskeletal: Negative. Skin: Negative. Neurological: Negative. Endo/Heme/Allergies: Negative. Psychiatric/Behavioral: Negative.         Physical Exam    ASSESSMENT and PLAN  {ASSESSMENT/PLAN:47703}

## 2018-02-05 NOTE — LETTER
2/6/2018 8:58 AM 
 
Patient:  Minda White YOB: 1955 Date of Visit: 2/5/2018 Dear Dr. Stanley Cottrell: 
 
 
Thank you for referring Ms. Squire Apley to me for evaluation/treatment. Below are the relevant portions of my assessment and plan of care. HISTORY OF PRESENT ILLNESS Minda White is a 58 y.o. female. HPI 
NEW patient consult referred by Dr. Stanley Cottrell for RIGHT breast abnormal mammogram.  Denies palpable lump, skin changes, or nipple discharge/retraction. No pain.  
  
FH:  Denies FH of breast or ovarian cancer.  
  
Mammogram, 1/24/18, [facility] 4 Past Medical History:  
Diagnosis Date  AR (allergic rhinitis)  HLD (hyperlipidemia)  Post-menopausal AGE 50  
 Schatzki's ring  Vitamin D deficiency Past Surgical History:  
Procedure Laterality Date  COLONOSCOPY  11/15/10  
 small EH, repeat 10 years; dr Aram Severance  EGD  11/15/10  
 stricture ge junction (dilated), small hh   
 
 
Social History Social History  Marital status:  Spouse name: N/A  
 Number of children: N/A  
 Years of education: N/A Occupational History  Not on file. Social History Main Topics  Smoking status: Never Smoker  Smokeless tobacco: Never Used  Alcohol use 3.5 oz/week  
  7 Glasses of wine per week  Drug use: No  
 Sexual activity: Not on file Other Topics Concern  Not on file Social History Narrative Current Outpatient Prescriptions on File Prior to Visit Medication Sig Dispense Refill  rosuvastatin (CRESTOR) 10 mg tablet Take 1 Tab by mouth nightly. 90 Tab 3  
 esomeprazole (NEXIUM) 20 mg capsule Take 1 Cap by mouth daily. 90 Cap 3  cholecalciferol, vitamin d3, (VITAMIN D) 1,000 unit tablet Take  by mouth daily.  aspirin 81 mg tablet Take  by mouth.  GLUCOSAMINE SULFATE (GLUCOSAMINE PO) Take  by mouth daily.  MULTIVITAMIN PO Take  by mouth.  carbamide peroxide (DEBROX) 6.5 % otic solution Administer 5 Drops into each ear two (2) times a day.  psyllium (METAMUCIL) packet Take 1 Packet by mouth daily.  fluticasone (FLONASE) 50 mcg/actuation nasal spray 2 Sprays by Both Nostrils route daily.  loratadine (CLARITIN) 10 mg tablet Take 10 mg by mouth. No current facility-administered medications on file prior to visit. No Known Allergies OB History  Para Term  AB Living 3 3    3 SAB TAB Ectopic Molar Multiple Live Births Obstetric Comments Menarche:  15. LMP: 52.  # of Children:  3. Age at Delivery of First Child:  22.   Hysterectomy/oophorectomy:  NO/NO. Breast Bx:  no.  Hx of Breast Feeding:  yes. BCP:  yes. Hormone therapy:  Yes  to '10. ROS Constitutional: Negative HENT: Negative. Eyes: Negative. Respiratory: Negative. Cardiovascular: Negative. Gastrointestinal: Negative. Genitourinary: Negative. Musculoskeletal: Negative. Skin: Negative. Neurological: Negative. Endo/Heme/Allergies: Negative. Psychiatric/Behavioral: Negative. Physical Exam  
Cardiovascular: Normal rate and normal heart sounds. Pulmonary/Chest: Breath sounds normal. Right breast exhibits no inverted nipple, no mass, no nipple discharge, no skin change and no tenderness. Left breast exhibits no inverted nipple, no mass, no nipple discharge, no skin change and no tenderness. Breasts are symmetrical.  
Lymphadenopathy:  
     Right cervical: No superficial cervical, no deep cervical and no posterior cervical adenopathy present. Left cervical: No superficial cervical, no deep cervical and no posterior cervical adenopathy present. Right axillary: No pectoral and no lateral adenopathy present. Left axillary: No pectoral and no lateral adenopathy present. Stereotactic Biopsy PROCEDURE : LORAD STEREOTACTIC VACUUM ASSISTED BIOPSY AND RELATED DIGITAL MAMMOGRAPHY OF THE, right BREAST. INDICATION : MICROCALCIFICATIONS. CONSENT : Following a detailed description of the LORAD stereotactic core biopsy procedure, its risks, benefits and possible alternatives (open biopsy), the patient signed the informed consent. The risks and benefits of the procedure have been explained to the patient by the stereotactic technologist and Dr. Jono Bai. IMAGING : Prebiopsy digital stereotactic imaging of the breast were obtained and confirmed the presence of the abnormality in both  and stereotactic views. , The  rightBreast was imaged. PROJECTION : LM. BIOPSY PROCEDURE : Following sterile preparation of the skin, a cutaneous skin wheel of 1% lidocaine was use as a local anesthetic. a 4mm skin incision was made for the entry site of the biopsy needle. Prefire stereotactic images were obtained to confirm accurate targeting., An 7 gauge Encor needle was used for the biopsy, 7 biopsy specimens were obtained. CLIP PLACEMENT : A marking clip was placed for future mammographic reference and position was confirmed with a 0 degree postfire image. Jesse Box SPECIMEN RADIOGRAPHY : Digital spot mammography of the core biopsy specimens demonstrated microcalcifications corresponding to the targeted calcifications. Jesse Box FINAL PATHOLOGY : Is pending at this time. Jesse Box POST BIOPSY MAMMOGRAM : CLIP IN GOOD POSITION. ASSESSMENT and PLAN 
  ICD-10-CM ICD-9-CM 1. Abnormal mammogram of right breast R92.8 793.80 Pt presents with abnormal mammo of RT breast calcifications. After reviewing recent imaging, discussed stereo bx procedure. Biopsied site today without complications. Pt tolerated procedure well. Will f/u once path results become available. This plan was reviewed with the patient and patient agrees. All questions were answered.  
 
Written by Aamir Haas, as dictated by Dr. Lola Lowe MD.  
 
 
 If you have questions, please do not hesitate to call me. I look forward to following Ms. Beebe along with you.  
 
 
 
Sincerely, 
 
 
Gurinder Ashraf MD

## 2018-02-05 NOTE — PROGRESS NOTES
HISTORY OF PRESENT ILLNESS  Donna Mera is a 58 y.o. female. HPI  NEW patient consult referred by Dr. Chante Ugarte for RIGHT breast abnormal mammogram.  Denies palpable lump, skin changes, or nipple discharge/retraction. No pain.      FH:  Denies FH of breast or ovarian cancer.      Mammogram, 1/24/18, [facility] 4    Past Medical History:   Diagnosis Date    AR (allergic rhinitis)     HLD (hyperlipidemia)     Post-menopausal AGE 48    Schatzki's ring     Vitamin D deficiency        Past Surgical History:   Procedure Laterality Date    COLONOSCOPY  11/15/10    small EH, repeat 10 years; dr Nurys Russell EGD  11/15/10    stricture ge junction (dilated), small hh        Social History     Social History    Marital status:      Spouse name: N/A    Number of children: N/A    Years of education: N/A     Occupational History    Not on file. Social History Main Topics    Smoking status: Never Smoker    Smokeless tobacco: Never Used    Alcohol use 3.5 oz/week     7 Glasses of wine per week    Drug use: No    Sexual activity: Not on file     Other Topics Concern    Not on file     Social History Narrative       Current Outpatient Prescriptions on File Prior to Visit   Medication Sig Dispense Refill    rosuvastatin (CRESTOR) 10 mg tablet Take 1 Tab by mouth nightly. 90 Tab 3    esomeprazole (NEXIUM) 20 mg capsule Take 1 Cap by mouth daily. 90 Cap 3    cholecalciferol, vitamin d3, (VITAMIN D) 1,000 unit tablet Take  by mouth daily.  aspirin 81 mg tablet Take  by mouth.  GLUCOSAMINE SULFATE (GLUCOSAMINE PO) Take  by mouth daily.  MULTIVITAMIN PO Take  by mouth.  carbamide peroxide (DEBROX) 6.5 % otic solution Administer 5 Drops into each ear two (2) times a day.  psyllium (METAMUCIL) packet Take 1 Packet by mouth daily.  fluticasone (FLONASE) 50 mcg/actuation nasal spray 2 Sprays by Both Nostrils route daily.  loratadine (CLARITIN) 10 mg tablet Take 10 mg by mouth. No current facility-administered medications on file prior to visit. No Known Allergies    OB History      Para Term  AB Living    3 3    3    SAB TAB Ectopic Molar Multiple Live Births                 Obstetric Comments    Menarche:  15. LMP: 52.  # of Children:  3. Age at Delivery of First Child:  22.   Hysterectomy/oophorectomy:  NO/NO. Breast Bx:  no.  Hx of Breast Feeding:  yes. BCP:  yes. Hormone therapy:  Yes  to '10. ROS  Constitutional: Negative    HENT: Negative. Eyes: Negative. Respiratory: Negative. Cardiovascular: Negative. Gastrointestinal: Negative. Genitourinary: Negative. Musculoskeletal: Negative. Skin: Negative. Neurological: Negative. Endo/Heme/Allergies: Negative. Psychiatric/Behavioral: Negative. Physical Exam   Cardiovascular: Normal rate and normal heart sounds. Pulmonary/Chest: Breath sounds normal. Right breast exhibits no inverted nipple, no mass, no nipple discharge, no skin change and no tenderness. Left breast exhibits no inverted nipple, no mass, no nipple discharge, no skin change and no tenderness. Breasts are symmetrical.   Lymphadenopathy:        Right cervical: No superficial cervical, no deep cervical and no posterior cervical adenopathy present. Left cervical: No superficial cervical, no deep cervical and no posterior cervical adenopathy present. Right axillary: No pectoral and no lateral adenopathy present. Left axillary: No pectoral and no lateral adenopathy present. Stereotactic Biopsy  PROCEDURE : LORAD STEREOTACTIC VACUUM ASSISTED BIOPSY AND RELATED DIGITAL MAMMOGRAPHY OF THE, right BREAST. INDICATION : MICROCALCIFICATIONS. CONSENT : Following a detailed description of the LORAD stereotactic core biopsy procedure, its risks, benefits and possible alternatives (open biopsy), the patient signed the informed consent.  The risks and benefits of the procedure have been explained to the patient by the stereotactic technologist and Dr. Dinah Hoffman. IMAGING : Prebiopsy digital stereotactic imaging of the breast were obtained and confirmed the presence of the abnormality in both  and stereotactic views. , The  rightBreast was imaged. PROJECTION : LM.   BIOPSY PROCEDURE : Following sterile preparation of the skin, a cutaneous skin wheel of 1% lidocaine was use as a local anesthetic. a 4mm skin incision was made for the entry site of the biopsy needle. Prefire stereotactic images were obtained to confirm accurate targeting., An 7 gauge Encor needle was used for the biopsy, 7 biopsy specimens were obtained. CLIP PLACEMENT : A marking clip was placed for future mammographic reference and position was confirmed with a 0 degree postfire image. June Glee SPECIMEN RADIOGRAPHY : Digital spot mammography of the core biopsy specimens demonstrated microcalcifications corresponding to the targeted calcifications. June Glee FINAL PATHOLOGY : Is pending at this time. June Glee POST BIOPSY MAMMOGRAM : CLIP IN GOOD POSITION. ASSESSMENT and PLAN    ICD-10-CM ICD-9-CM    1. Abnormal mammogram of right breast R92.8 793.80      Pt presents with abnormal mammo of RT breast calcifications. After reviewing recent imaging, discussed stereo bx procedure. Biopsied site today without complications. Pt tolerated procedure well. Will f/u once path results become available. This plan was reviewed with the patient and patient agrees. All questions were answered.     Written by Anand Byers, as dictated by Dr. Edilberto Lea MD.

## 2018-02-05 NOTE — PROGRESS NOTES
[facility]  OFFICE PROCEDURE PROGRESS NOTE        Chart reviewed for the following:   Erica Mcconnell MD, have reviewed the History, Physical and updated the Allergic reactions for [address] performed immediately prior to start of procedure:   Erica Mcconnell MD, have performed the following reviews on Lala Liner prior to the start of the procedure:            * Patient was identified by name and date of birth   * Agreement on procedure being performed was verified  * Risks and Benefits explained to the patient  * Procedure site verified and marked as necessary  * Patient was positioned for comfort  * Consent was signed and verified     Time:  2:52 pm      Date of procedure: 2/5/2018    Procedure performed by:  Laurence Cruz MD    Provider assisted by:   Dylan Landeros RN    Patient assisted by: self    How tolerated by patient: tolerated the procedure well with no complications    Post Procedural Pain Scale: 0 - No Hurt    Comments:   Written and verbal post biopsy instructions reviewed with and given to patient with her understanding.

## 2018-02-06 NOTE — COMMUNICATION BODY
HISTORY OF PRESENT ILLNESS  Imani Leigh is a 58 y.o. female. HPI  NEW patient consult referred by Dr. Norm Sahu for RIGHT breast abnormal mammogram.  Denies palpable lump, skin changes, or nipple discharge/retraction. No pain.      FH:  Denies FH of breast or ovarian cancer.      Mammogram, 1/24/18, BIRADS 4    Past Medical History:   Diagnosis Date    AR (allergic rhinitis)     HLD (hyperlipidemia)     Post-menopausal AGE 48    Schatzki's ring     Vitamin D deficiency        Past Surgical History:   Procedure Laterality Date    COLONOSCOPY  11/15/10    small EH, repeat 10 years; dr Bairon Rogers EGD  11/15/10    stricture ge junction (dilated), small hh        Social History     Social History    Marital status:      Spouse name: N/A    Number of children: N/A    Years of education: N/A     Occupational History    Not on file. Social History Main Topics    Smoking status: Never Smoker    Smokeless tobacco: Never Used    Alcohol use 3.5 oz/week     7 Glasses of wine per week    Drug use: No    Sexual activity: Not on file     Other Topics Concern    Not on file     Social History Narrative       Current Outpatient Prescriptions on File Prior to Visit   Medication Sig Dispense Refill    rosuvastatin (CRESTOR) 10 mg tablet Take 1 Tab by mouth nightly. 90 Tab 3    esomeprazole (NEXIUM) 20 mg capsule Take 1 Cap by mouth daily. 90 Cap 3    cholecalciferol, vitamin d3, (VITAMIN D) 1,000 unit tablet Take  by mouth daily.  aspirin 81 mg tablet Take  by mouth.  GLUCOSAMINE SULFATE (GLUCOSAMINE PO) Take  by mouth daily.  MULTIVITAMIN PO Take  by mouth.  carbamide peroxide (DEBROX) 6.5 % otic solution Administer 5 Drops into each ear two (2) times a day.  psyllium (METAMUCIL) packet Take 1 Packet by mouth daily.  fluticasone (FLONASE) 50 mcg/actuation nasal spray 2 Sprays by Both Nostrils route daily.  loratadine (CLARITIN) 10 mg tablet Take 10 mg by mouth. No current facility-administered medications on file prior to visit. No Known Allergies    OB History      Para Term  AB Living    3 3    3    SAB TAB Ectopic Molar Multiple Live Births                 Obstetric Comments    Menarche:  15. LMP: 52.  # of Children:  3. Age at Delivery of First Child:  22.   Hysterectomy/oophorectomy:  NO/NO. Breast Bx:  no.  Hx of Breast Feeding:  yes. BCP:  yes. Hormone therapy:  Yes  to '10. ROS  Constitutional: Negative    HENT: Negative. Eyes: Negative. Respiratory: Negative. Cardiovascular: Negative. Gastrointestinal: Negative. Genitourinary: Negative. Musculoskeletal: Negative. Skin: Negative. Neurological: Negative. Endo/Heme/Allergies: Negative. Psychiatric/Behavioral: Negative. Physical Exam   Cardiovascular: Normal rate and normal heart sounds. Pulmonary/Chest: Breath sounds normal. Right breast exhibits no inverted nipple, no mass, no nipple discharge, no skin change and no tenderness. Left breast exhibits no inverted nipple, no mass, no nipple discharge, no skin change and no tenderness. Breasts are symmetrical.   Lymphadenopathy:        Right cervical: No superficial cervical, no deep cervical and no posterior cervical adenopathy present. Left cervical: No superficial cervical, no deep cervical and no posterior cervical adenopathy present. Right axillary: No pectoral and no lateral adenopathy present. Left axillary: No pectoral and no lateral adenopathy present. Stereotactic Biopsy  PROCEDURE : LORAD STEREOTACTIC VACUUM ASSISTED BIOPSY AND RELATED DIGITAL MAMMOGRAPHY OF THE, right BREAST. INDICATION : MICROCALCIFICATIONS. CONSENT : Following a detailed description of the LORAD stereotactic core biopsy procedure, its risks, benefits and possible alternatives (open biopsy), the patient signed the informed consent.  The risks and benefits of the procedure have been explained to the patient by the stereotactic technologist and Dr. Blake Moore. IMAGING : Prebiopsy digital stereotactic imaging of the breast were obtained and confirmed the presence of the abnormality in both  and stereotactic views. , The  rightBreast was imaged. PROJECTION : LM.   BIOPSY PROCEDURE : Following sterile preparation of the skin, a cutaneous skin wheel of 1% lidocaine was use as a local anesthetic. a 4mm skin incision was made for the entry site of the biopsy needle. Prefire stereotactic images were obtained to confirm accurate targeting., An 7 gauge Encor needle was used for the biopsy, 7 biopsy specimens were obtained. CLIP PLACEMENT : A marking clip was placed for future mammographic reference and position was confirmed with a 0 degree postfire image. Michela Ham SPECIMEN RADIOGRAPHY : Digital spot mammography of the core biopsy specimens demonstrated microcalcifications corresponding to the targeted calcifications. Michela Ham FINAL PATHOLOGY : Is pending at this time. Michela Ham POST BIOPSY MAMMOGRAM : CLIP IN GOOD POSITION. ASSESSMENT and PLAN    ICD-10-CM ICD-9-CM    1. Abnormal mammogram of right breast R92.8 793.80      Pt presents with abnormal mammo of RT breast calcifications. After reviewing recent imaging, discussed stereo bx procedure. Biopsied site today without complications. Pt tolerated procedure well. Will f/u once path results become available. This plan was reviewed with the patient and patient agrees. All questions were answered.     Written by Kayden Best, as dictated by Dr. Lety Tay MD.

## 2018-02-08 ENCOUNTER — TELEPHONE (OUTPATIENT)
Dept: SURGERY | Age: 63
End: 2018-02-08

## 2018-02-08 DIAGNOSIS — D05.91 MALIGNANT NEOPLASM OF RIGHT BREAST, STAGE 0: Primary | ICD-10-CM

## 2018-02-13 ENCOUNTER — TELEPHONE (OUTPATIENT)
Dept: SURGERY | Age: 63
End: 2018-02-13

## 2018-02-13 DIAGNOSIS — C50.919 MALIGNANT NEOPLASM OF FEMALE BREAST, UNSPECIFIED ESTROGEN RECEPTOR STATUS, UNSPECIFIED LATERALITY, UNSPECIFIED SITE OF BREAST (HCC): Primary | ICD-10-CM

## 2018-02-13 RX ORDER — ALPRAZOLAM 0.5 MG/1
TABLET ORAL
Qty: 10 TAB | Refills: 0 | OUTPATIENT
Start: 2018-02-13 | End: 2018-03-21

## 2018-02-13 NOTE — TELEPHONE ENCOUNTER
Needs sedation for breast MRI. I spoke to her about Xanax and told her how to take it. She wants this called to [facility] at , which I did for her. She wants her daughter to come in to town when she has her breast talk. I gave the message to one of the PSRs to call the patient later today to set up a breast talk for the 28th of February or the 5th of March.

## 2018-02-26 ENCOUNTER — HOSPITAL ENCOUNTER (OUTPATIENT)
Dept: MRI IMAGING | Age: 63
Discharge: HOME | End: 2018-02-26
Attending: SURGERY
Payer: COMMERCIAL

## 2018-02-26 DIAGNOSIS — D05.91 MALIGNANT NEOPLASM OF RIGHT BREAST, STAGE 0: ICD-10-CM

## 2018-02-26 PROBLEM — D05.10 DCIS (DUCTAL CARCINOMA IN SITU): Status: ACTIVE | Noted: 2018-02-26

## 2018-02-26 LAB — CREAT BLD-MCNC: 0.8 MG/DL (ref 0.6–1.3)

## 2018-02-26 PROCEDURE — 0159T MRI BREAST BI W WO CONT: CPT

## 2018-02-26 PROCEDURE — A9585 GADOBUTROL INJECTION: HCPCS | Performed by: SURGERY

## 2018-02-26 PROCEDURE — 82565 ASSAY OF CREATININE: CPT

## 2018-02-26 PROCEDURE — 74011000258 HC RX REV CODE- 258: Performed by: SURGERY

## 2018-02-26 PROCEDURE — 74011250636 HC RX REV CODE- 250/636: Performed by: SURGERY

## 2018-02-26 RX ADMIN — GADOBUTROL 10 ML: 604.72 INJECTION INTRAVENOUS at 10:00

## 2018-02-26 RX ADMIN — SODIUM CHLORIDE 100 ML: 900 INJECTION, SOLUTION INTRAVENOUS at 10:00

## 2018-02-27 ENCOUNTER — TELEPHONE (OUTPATIENT)
Dept: SURGERY | Age: 63
End: 2018-02-27

## 2018-02-28 ENCOUNTER — OFFICE VISIT (OUTPATIENT)
Dept: SURGERY | Age: 63
End: 2018-02-28

## 2018-02-28 VITALS — BODY MASS INDEX: 33.92 KG/M2 | WEIGHT: 229 LBS | HEIGHT: 69 IN

## 2018-02-28 DIAGNOSIS — D05.11 DUCTAL CARCINOMA IN SITU (DCIS) OF RIGHT BREAST: Primary | ICD-10-CM

## 2018-02-28 NOTE — PROGRESS NOTES
HISTORY OF PRESENT ILLNESS  Toño Askew is a 58 y.o. female. HPI  ESTABLISHED patient here to discuss plan of care for RIGHT breast cancer. She is doing well. Denies pain.      02/06/18: RIGHT breast core bx of grade 2 DCIS. ER+90%    Breast MRI, 2/27/18, BIRADS 6  ROS    Physical Exam    ASSESSMENT and PLAN  {ASSESSMENT/PLAN:83693}

## 2018-02-28 NOTE — PROGRESS NOTES
HISTORY OF PRESENT ILLNESS  Norma Monsalve is a 58 y.o. female. HPI  ESTABLISHED patient here to discuss plan of care for RIGHT breast cancer. She is doing well. Denies pain.      02/06/18: RIGHT breast core bx of grade 2 DCIS. ER+90%     Breast MRI, 2/27/18, BIRADS 6    Past Medical History:   Diagnosis Date    AR (allergic rhinitis)     HLD (hyperlipidemia)     Post-menopausal AGE 48    Schatzki's ring     Vitamin D deficiency        Past Surgical History:   Procedure Laterality Date    COLONOSCOPY  11/15/10    small EH, repeat 10 years; dr Day Miss EGD  11/15/10    stricture ge junction (dilated), small hh        Social History     Social History    Marital status:      Spouse name: N/A    Number of children: N/A    Years of education: N/A     Occupational History    Not on file. Social History Main Topics    Smoking status: Never Smoker    Smokeless tobacco: Never Used    Alcohol use 3.5 oz/week     7 Glasses of wine per week    Drug use: No    Sexual activity: Not on file     Other Topics Concern    Not on file     Social History Narrative       Current Outpatient Prescriptions on File Prior to Visit   Medication Sig Dispense Refill    ALPRAZolam (XANAX) 0.5 mg tablet Take 1/2 to one pill 30 to 45 mins prior to procedure. Indications: Breast Cancer 10 Tab 0    carbamide peroxide (DEBROX) 6.5 % otic solution Administer 5 Drops into each ear two (2) times a day.  rosuvastatin (CRESTOR) 10 mg tablet Take 1 Tab by mouth nightly. 90 Tab 3    esomeprazole (NEXIUM) 20 mg capsule Take 1 Cap by mouth daily. 90 Cap 3    psyllium (METAMUCIL) packet Take 1 Packet by mouth daily.  fluticasone (FLONASE) 50 mcg/actuation nasal spray 2 Sprays by Both Nostrils route daily.  loratadine (CLARITIN) 10 mg tablet Take 10 mg by mouth.  cholecalciferol, vitamin d3, (VITAMIN D) 1,000 unit tablet Take  by mouth daily.  aspirin 81 mg tablet Take  by mouth.       GLUCOSAMINE SULFATE (GLUCOSAMINE PO) Take  by mouth daily.  MULTIVITAMIN PO Take  by mouth. No current facility-administered medications on file prior to visit. No Known Allergies    OB History      Para Term  AB Living    3 3    3    SAB TAB Ectopic Molar Multiple Live Births                 Obstetric Comments    Menarche:  15. LMP: 52.  # of Children:  3. Age at Delivery of First Child:  22.   Hysterectomy/oophorectomy:  NO/NO. Breast Bx:  no.  Hx of Breast Feeding:  yes. BCP:  yes. Hormone therapy:  Yes  to '10. ROS    Physical Exam   Cardiovascular: Normal rate, regular rhythm and normal heart sounds. Pulmonary/Chest: Breath sounds normal. Right breast exhibits no inverted nipple, no mass, no nipple discharge, no skin change and no tenderness. Left breast exhibits no inverted nipple, no mass, no nipple discharge, no skin change and no tenderness. Breasts are symmetrical.       ASSESSMENT and PLAN  Encounter Diagnoses   Name Primary?  Ductal carcinoma in situ (DCIS) of right breast Yes      Disposition: RIGHT lumpectomy at [address] [place] [address] in the near future. Pt here today with new diagnosis of RIGHT breast DCIS dx. A total of 45 minutes were spent face-to-face with the patient and her daughter during this encounter and over half of that time was spent on counseling and coordination of care. We discussed in depth the pathology results and need for surgery regarding MRI for extent of disease and surgical planning. Discussed  options with risks and complications of breast conservation with XRT options as well as mastectomy with reconstructive options, both unilateral and bilateral with inherent benefits and limitations. We also discussed the need and utility of post surgical medical therapy. We also covered risk reduction strategies as well as post surgical therapies including hormonal therapy and its risks. Discussed my recommendation for lumpectomy.  Patient would like to proceed with lumpectomy in the near future at [address] [place] [address]- will schedule. Informed that the need for XRT will be determined following risk profile report from [id number]. Discussed how if XRT is required, patient would get about 16 treatments over 4-6 weeks. Pt curious about intraoperative radiation therapy. Discussed that we do not to IORT here. Discussed how risk of second breast cancer is 13% which is about normal risk. She should get annual 3D mammograms with dense breasts. This plan was reviewed with the patient and patient agrees. All questions were answered.     Written by Irlanda Goss, as dictated by Dr. Jennifer Ferrell MD.

## 2018-02-28 NOTE — COMMUNICATION BODY
HISTORY OF PRESENT ILLNESS  Дмитрий Hamlin is a 58 y.o. female. HPI  ESTABLISHED patient here to discuss plan of care for RIGHT breast cancer. She is doing well. Denies pain.      02/06/18: RIGHT breast core bx of grade 2 DCIS. ER+90%     Breast MRI, 2/27/18, BIRADS 6    Past Medical History:   Diagnosis Date    AR (allergic rhinitis)     HLD (hyperlipidemia)     Post-menopausal AGE 48    Schatzki's ring     Vitamin D deficiency        Past Surgical History:   Procedure Laterality Date    COLONOSCOPY  11/15/10    small EH, repeat 10 years; dr Pushpa Hurtado EGD  11/15/10    stricture ge junction (dilated), small hh        Social History     Social History    Marital status:      Spouse name: N/A    Number of children: N/A    Years of education: N/A     Occupational History    Not on file. Social History Main Topics    Smoking status: Never Smoker    Smokeless tobacco: Never Used    Alcohol use 3.5 oz/week     7 Glasses of wine per week    Drug use: No    Sexual activity: Not on file     Other Topics Concern    Not on file     Social History Narrative       Current Outpatient Prescriptions on File Prior to Visit   Medication Sig Dispense Refill    ALPRAZolam (XANAX) 0.5 mg tablet Take 1/2 to one pill 30 to 45 mins prior to procedure. Indications: Breast Cancer 10 Tab 0    carbamide peroxide (DEBROX) 6.5 % otic solution Administer 5 Drops into each ear two (2) times a day.  rosuvastatin (CRESTOR) 10 mg tablet Take 1 Tab by mouth nightly. 90 Tab 3    esomeprazole (NEXIUM) 20 mg capsule Take 1 Cap by mouth daily. 90 Cap 3    psyllium (METAMUCIL) packet Take 1 Packet by mouth daily.  fluticasone (FLONASE) 50 mcg/actuation nasal spray 2 Sprays by Both Nostrils route daily.  loratadine (CLARITIN) 10 mg tablet Take 10 mg by mouth.  cholecalciferol, vitamin d3, (VITAMIN D) 1,000 unit tablet Take  by mouth daily.  aspirin 81 mg tablet Take  by mouth.       GLUCOSAMINE SULFATE (GLUCOSAMINE PO) Take  by mouth daily.  MULTIVITAMIN PO Take  by mouth. No current facility-administered medications on file prior to visit. No Known Allergies    OB History      Para Term  AB Living    3 3    3    SAB TAB Ectopic Molar Multiple Live Births                 Obstetric Comments    Menarche:  15. LMP: 52.  # of Children:  3. Age at Delivery of First Child:  22.   Hysterectomy/oophorectomy:  NO/NO. Breast Bx:  no.  Hx of Breast Feeding:  yes. BCP:  yes. Hormone therapy:  Yes  to '10. ROS    Physical Exam   Cardiovascular: Normal rate, regular rhythm and normal heart sounds. Pulmonary/Chest: Breath sounds normal. Right breast exhibits no inverted nipple, no mass, no nipple discharge, no skin change and no tenderness. Left breast exhibits no inverted nipple, no mass, no nipple discharge, no skin change and no tenderness. Breasts are symmetrical.       ASSESSMENT and PLAN  Encounter Diagnoses   Name Primary?  Ductal carcinoma in situ (DCIS) of right breast Yes      Disposition: RIGHT [facility] in the near future. Pt here today with new diagnosis of RIGHT breast DCIS dx. A total of 45 minutes were spent face-to-face with the patient and her daughter during this encounter and over half of that time was spent on counseling and coordination of care. We discussed in depth the pathology results and need for surgery regarding MRI for extent of disease and surgical planning. Discussed  options with risks and complications of breast conservation with XRT options as well as mastectomy with reconstructive options, both unilateral and bilateral with inherent benefits and limitations. We also discussed the need and utility of post surgical medical therapy. We also covered risk reduction strategies as well as post surgical therapies including hormonal therapy and its risks. Discussed my recommendation for lumpectomy.  Patient would like to proceed with lumpectomy in the near future at [facility]- will schedule. Informed that the need for XRT will be determined following risk profile report from [id number]. Discussed how if XRT is required, patient would get about 16 treatments over 4-6 weeks. Pt curious about intraoperative radiation therapy. Discussed that we do not to IORT here. Discussed how risk of second breast cancer is 13% which is about normal risk. She should get annual 3D mammograms with dense breasts. This plan was reviewed with the patient and patient agrees. All questions were answered.     Written by Stacey Joyce, as dictated by Dr. David Gardner MD.

## 2018-03-01 DIAGNOSIS — E78.2 MIXED HYPERLIPIDEMIA: ICD-10-CM

## 2018-03-01 DIAGNOSIS — E55.9 VITAMIN D DEFICIENCY: ICD-10-CM

## 2018-03-21 ENCOUNTER — OFFICE VISIT (OUTPATIENT)
Dept: INTERNAL MEDICINE CLINIC | Age: 63
End: 2018-03-21

## 2018-03-21 VITALS
DIASTOLIC BLOOD PRESSURE: 79 MMHG | SYSTOLIC BLOOD PRESSURE: 126 MMHG | HEIGHT: 69 IN | TEMPERATURE: 98.3 F | HEART RATE: 84 BPM | WEIGHT: 230 LBS | RESPIRATION RATE: 14 BRPM | BODY MASS INDEX: 34.07 KG/M2 | OXYGEN SATURATION: 99 %

## 2018-03-21 DIAGNOSIS — D05.11 DUCTAL CARCINOMA IN SITU OF RIGHT BREAST: Primary | ICD-10-CM

## 2018-03-21 DIAGNOSIS — J06.9 VIRAL UPPER RESPIRATORY TRACT INFECTION: Primary | ICD-10-CM

## 2018-03-21 RX ORDER — AZITHROMYCIN 250 MG/1
250 TABLET, FILM COATED ORAL SEE ADMIN INSTRUCTIONS
Qty: 6 TAB | Refills: 0 | Status: SHIPPED | OUTPATIENT
Start: 2018-03-21 | End: 2018-03-26

## 2018-03-21 NOTE — PROGRESS NOTES
HISTORY OF PRESENT ILLNESS  Fortino Dobbins is a 58 y.o. female. HPI   Patient reports onset of sneezing, congestion, cough on Sunday. She developed body aches, stomach pains, throat pain. She notes throat pain has resolved and cough is non-productive. She has been taking DayQuil to treat. Review of Systems   All other systems reviewed and are negative. Physical Exam   Constitutional: She is oriented to person, place, and time. She appears well-developed and well-nourished. HENT:   Head: Normocephalic and atraumatic. Right Ear: External ear normal.   Left Ear: External ear normal.   Nose: Nose normal.   Mouth/Throat: Oropharynx is clear and moist.   Drainage in back of throat. Eyes: Conjunctivae and EOM are normal.   Neck: Normal range of motion. Neck supple. Carotid bruit is not present. No thyroid mass and no thyromegaly present. Cardiovascular: Normal rate, regular rhythm, S1 normal, S2 normal, normal heart sounds and intact distal pulses. Pulmonary/Chest: [place] normal and breath sounds normal.   [place] slight wheeze in upper airways. Abdominal: Soft. Normal appearance and bowel sounds are normal. There is no hepatosplenomegaly. There is no tenderness. Musculoskeletal: Normal range of motion. Neurological: She is alert and oriented to person, place, and time. She has normal strength. No cranial nerve deficit or sensory deficit. Coordination normal.   Skin: Skin is warm, dry and intact. No abrasion and no rash noted. Psychiatric: She has a normal mood and affect. Her behavior is normal. Judgment and thought content normal.   Nursing note and vitals reviewed. ASSESSMENT and PLAN  Diagnoses and all orders for this visit:    1. Viral upper respiratory tract infection   Presents with drainage, cough, congestion, slight wheeze in upper airways. Gave script for azithromycin. She will take antibiotics if pressure, chills, body aches develop and worsen through the weekend.      Other orders  -     azithromycin (ZITHROMAX) 250 mg tablet; Take 1 Tab by mouth See Admin Instructions for 5 days. lab results and schedule of future lab studies reviewed with patient  reviewed diet, exercise and weight control    Written by Puja Love, as dictated by Teetee Teague MD.     Current diagnosis and concerns discussed with pt at length. Understands risks and benefits or current treatment plan and medications and accepts the treatment and medication with any possible risks. Pt asks appropriate questions which were answered. Pt instructed to call with any concerns or problems.

## 2018-03-26 ENCOUNTER — HOSPITAL ENCOUNTER (OUTPATIENT)
Dept: PREADMISSION TESTING | Age: 63
Discharge: HOME | End: 2018-03-26
Payer: COMMERCIAL

## 2018-03-26 VITALS
WEIGHT: 226.85 LBS | RESPIRATION RATE: 18 BRPM | HEART RATE: 78 BPM | DIASTOLIC BLOOD PRESSURE: 76 MMHG | OXYGEN SATURATION: 94 % | SYSTOLIC BLOOD PRESSURE: 147 MMHG | TEMPERATURE: 97.8 F | BODY MASS INDEX: 33.6 KG/M2 | HEIGHT: 69 IN

## 2018-03-26 LAB
ANION GAP SERPL CALC-SCNC: 7 MMOL/L (ref 5–15)
ATRIAL RATE: 72 BPM
BUN SERPL-MCNC: 9 MG/DL (ref 6–20)
BUN/CREAT SERPL: 11 (ref 12–20)
CALCIUM SERPL-MCNC: 9.2 MG/DL (ref 8.5–10.1)
CALCULATED P AXIS, ECG09: 60 DEGREES
CALCULATED R AXIS, ECG10: 39 DEGREES
CALCULATED T AXIS, ECG11: 46 DEGREES
CHLORIDE SERPL-SCNC: 105 MMOL/L (ref 97–108)
CO2 SERPL-SCNC: 30 MMOL/L (ref 21–32)
CREAT SERPL-MCNC: 0.82 MG/DL (ref 0.55–1.02)
DIAGNOSIS, 93000: NORMAL
GLUCOSE SERPL-MCNC: 98 MG/DL (ref 65–100)
P-R INTERVAL, ECG05: 144 MS
POTASSIUM SERPL-SCNC: 4.2 MMOL/L (ref 3.5–5.1)
Q-T INTERVAL, ECG07: 408 MS
QRS DURATION, ECG06: 90 MS
QTC CALCULATION (BEZET), ECG08: 446 MS
SODIUM SERPL-SCNC: 142 MMOL/L (ref 136–145)
VENTRICULAR RATE, ECG03: 72 BPM

## 2018-03-26 PROCEDURE — 93005 ELECTROCARDIOGRAM TRACING: CPT

## 2018-03-26 PROCEDURE — 80048 BASIC METABOLIC PNL TOTAL CA: CPT | Performed by: SURGERY

## 2018-03-26 PROCEDURE — 36415 COLL VENOUS BLD VENIPUNCTURE: CPT | Performed by: SURGERY

## 2018-03-26 RX ORDER — ESOMEPRAZOLE MAGNESIUM 10 MG/1
10 GRANULE, FOR SUSPENSION, EXTENDED RELEASE ORAL DAILY
COMMUNITY

## 2018-03-26 NOTE — PERIOP NOTES
[address] [facility], [facility] [address]    MAIN OR [phone]    MAIN PRE OP [phone]    AMBULATORY PRE OP [phone]    PRE-ADMISSION TESTING [phone]       Surgery Date:   April 3, 2018, Tuesday. Answers to Common Questions   When You  Arrive   Arrive at the [address]nd [address] [facility] on the day of your surgery  Have your insurance card, photo ID, and any copayment (if needed)     Food   and   Drink   NO food or drink after midnight the night before surgery    This means NO water, gum, mints, coffee, juice, etc.  No alcohol (beer, wine, liquor) 24 hours before and after surgery     Medicine to   TAKE   Morning of Surgery   MEDICATIONS TO TAKE THE MORNING OF SURGERY WITH A SIP OF WATER:    None     Medicine  To  STOP   FOR PAIN   NO Aspirin for pain STOP on Wednesday, 3/28/18 until after surgery   NO Non-Steroidal Anti-Inflammatory Drugs (NSAIDs:   for example, Ibuprofen (Advil, Motrin), Naproxen (Aleve)   STOP herbal supplements and vitamins 1 week before surgery   You can take Tylenol  follow instructions on the bottle     Blood  Thinners    If you take Aspirin, Plavix, Coumadin, blood-thinning or anti-clot medicine, talk to your surgeon and/or follow the instructions from the doctor who told you to take that medicine     Clothing  Jewelry  Valuables  Bathing     CLOTHING   Wear loose, comfortable clothes   Wear glasses instead of contacts   Leave money, jewelry and valuables at home   No make-up, particularly mascara, the day of surgery   REMOVE ALL piercings, rings, and jewelry - leave at home   Wear hair loose or down; no pony-tails, buns, or metal hair clips    BATHING   Follow all special bath instructions (for total joint replacement, spine and bowel surgeries.)   If you shower the morning of surgery, please do not apply any lotions, powders, or deodorants afterwards.   Do not shave or trim anywhere 24 hours before surgery. Going Home  or  Spending the Night    SAME-DAY SURGERY: You must have a responsible adult drive you home and stay with you 24 hours after surgery   ADMITS: If your doctor is keeping you into the hospital after surgery, leave personal belongings/luggage in your car until you have a hospital room number. Hospital discharge time is 12 noon  Drivers must be here before 12 noon unless you are told differently         Follow all instructions so your surgery wont be cancelled. Please, be on time. If a situation occurs and you are delayed the day of surgery, call [phone] or [phone]. If your physical condition changes (like a fever, cold, flu, etc.) call your surgeon as soon as possible. The Preadmission Testing staff can be reached at [phone]. OTHER SPECIAL INSTRUCTIONS:  Free  parking. Start  using incentive spirometry today and one week after surgery. Bring completed medication form. Follow Dr. Maxwell Ryan instructions. The patient was contacted  in person. She  verbalize  understanding of all instructions and does not  need reinforcement.

## 2018-03-28 RX ORDER — GUAIFENESIN 600 MG/1
600 TABLET, EXTENDED RELEASE ORAL
COMMUNITY
End: 2018-05-03 | Stop reason: ALTCHOICE

## 2018-04-02 ENCOUNTER — ANESTHESIA EVENT (OUTPATIENT)
Dept: SURGERY | Age: 63
End: 2018-04-02
Payer: COMMERCIAL

## 2018-04-03 ENCOUNTER — ANESTHESIA (OUTPATIENT)
Dept: SURGERY | Age: 63
End: 2018-04-03
Payer: COMMERCIAL

## 2018-04-03 ENCOUNTER — HOSPITAL ENCOUNTER (OUTPATIENT)
Age: 63
Setting detail: OUTPATIENT SURGERY
Discharge: HOME | End: 2018-04-03
Attending: SURGERY | Admitting: SURGERY
Payer: COMMERCIAL

## 2018-04-03 VITALS
SYSTOLIC BLOOD PRESSURE: 156 MMHG | DIASTOLIC BLOOD PRESSURE: 81 MMHG | BODY MASS INDEX: 33.6 KG/M2 | WEIGHT: 226.85 LBS | HEART RATE: 86 BPM | OXYGEN SATURATION: 96 % | TEMPERATURE: 97.8 F | HEIGHT: 69 IN | RESPIRATION RATE: 23 BRPM

## 2018-04-03 DIAGNOSIS — D05.11 DUCTAL CARCINOMA IN SITU OF RIGHT BREAST: ICD-10-CM

## 2018-04-03 PROCEDURE — 77030011267 HC ELECTRD BLD COVD -A: Performed by: SURGERY

## 2018-04-03 PROCEDURE — 74011250636 HC RX REV CODE- 250/636: Performed by: ANESTHESIOLOGY

## 2018-04-03 PROCEDURE — 74011250636 HC RX REV CODE- 250/636

## 2018-04-03 PROCEDURE — 77030018836 HC SOL IRR NACL ICUM -A: Performed by: SURGERY

## 2018-04-03 PROCEDURE — 77030002996 HC SUT SLK J&J -A: Performed by: SURGERY

## 2018-04-03 PROCEDURE — 77030002933 HC SUT MCRYL J&J -A: Performed by: SURGERY

## 2018-04-03 PROCEDURE — 88307 TISSUE EXAM BY PATHOLOGIST: CPT | Performed by: SURGERY

## 2018-04-03 PROCEDURE — 77030020143 HC AIRWY LARYN INTUB CGAS -A: Performed by: NURSE ANESTHETIST, CERTIFIED REGISTERED

## 2018-04-03 PROCEDURE — 76030000001 HC AMB SURG OR TIME 1 TO 1.5: Performed by: SURGERY

## 2018-04-03 PROCEDURE — 77030010507 HC ADH SKN DERMBND J&J -B: Performed by: SURGERY

## 2018-04-03 PROCEDURE — 77030032490 HC SLV COMPR SCD KNE COVD -B: Performed by: SURGERY

## 2018-04-03 PROCEDURE — 76210000050 HC AMBSU PH II REC 0.5 TO 1 HR: Performed by: SURGERY

## 2018-04-03 PROCEDURE — 76210000040 HC AMBSU PH I REC FIRST 0.5 HR: Performed by: SURGERY

## 2018-04-03 PROCEDURE — 76060000062 HC AMB SURG ANES 1 TO 1.5 HR: Performed by: SURGERY

## 2018-04-03 PROCEDURE — 74011250637 HC RX REV CODE- 250/637

## 2018-04-03 PROCEDURE — 77030031139 HC SUT VCRL2 J&J -A: Performed by: SURGERY

## 2018-04-03 PROCEDURE — A4648 IMPLANTABLE TISSUE MARKER: HCPCS | Performed by: SURGERY

## 2018-04-03 PROCEDURE — 74011000250 HC RX REV CODE- 250: Performed by: SURGERY

## 2018-04-03 PROCEDURE — 77030020782 HC GWN BAIR PAWS FLX 3M -B

## 2018-04-03 PROCEDURE — 74011250637 HC RX REV CODE- 250/637: Performed by: SURGERY

## 2018-04-03 PROCEDURE — 74011000250 HC RX REV CODE- 250

## 2018-04-03 RX ORDER — FAMOTIDINE 10 MG/ML
INJECTION INTRAVENOUS AS NEEDED
Status: DISCONTINUED | OUTPATIENT
Start: 2018-04-03 | End: 2018-04-03 | Stop reason: HOSPADM

## 2018-04-03 RX ORDER — EPHEDRINE SULFATE 50 MG/ML
INJECTION, SOLUTION INTRAVENOUS AS NEEDED
Status: DISCONTINUED | OUTPATIENT
Start: 2018-04-03 | End: 2018-04-03 | Stop reason: HOSPADM

## 2018-04-03 RX ORDER — ONDANSETRON 2 MG/ML
INJECTION INTRAMUSCULAR; INTRAVENOUS AS NEEDED
Status: DISCONTINUED | OUTPATIENT
Start: 2018-04-03 | End: 2018-04-03 | Stop reason: HOSPADM

## 2018-04-03 RX ORDER — SUCCINYLCHOLINE CHLORIDE 20 MG/ML
INJECTION INTRAMUSCULAR; INTRAVENOUS AS NEEDED
Status: DISCONTINUED | OUTPATIENT
Start: 2018-04-03 | End: 2018-04-03 | Stop reason: HOSPADM

## 2018-04-03 RX ORDER — LIDOCAINE HYDROCHLORIDE 20 MG/ML
INJECTION, SOLUTION EPIDURAL; INFILTRATION; INTRACAUDAL; PERINEURAL AS NEEDED
Status: DISCONTINUED | OUTPATIENT
Start: 2018-04-03 | End: 2018-04-03 | Stop reason: HOSPADM

## 2018-04-03 RX ORDER — ALBUTEROL SULFATE 90 UG/1
AEROSOL, METERED RESPIRATORY (INHALATION) AS NEEDED
Status: DISCONTINUED | OUTPATIENT
Start: 2018-04-03 | End: 2018-04-03 | Stop reason: HOSPADM

## 2018-04-03 RX ORDER — SODIUM CHLORIDE, SODIUM LACTATE, POTASSIUM CHLORIDE, CALCIUM CHLORIDE 600; 310; 30; 20 MG/100ML; MG/100ML; MG/100ML; MG/100ML
125 INJECTION, SOLUTION INTRAVENOUS CONTINUOUS
Status: DISCONTINUED | OUTPATIENT
Start: 2018-04-03 | End: 2018-04-03 | Stop reason: HOSPADM

## 2018-04-03 RX ORDER — LIDOCAINE HYDROCHLORIDE AND EPINEPHRINE 10; 10 MG/ML; UG/ML
30 INJECTION, SOLUTION INFILTRATION; PERINEURAL ONCE
Status: CANCELLED | OUTPATIENT
Start: 2018-04-03 | End: 2018-04-03

## 2018-04-03 RX ORDER — CEFAZOLIN SODIUM 1 G/3ML
INJECTION, POWDER, FOR SOLUTION INTRAMUSCULAR; INTRAVENOUS AS NEEDED
Status: DISCONTINUED | OUTPATIENT
Start: 2018-04-03 | End: 2018-04-03 | Stop reason: HOSPADM

## 2018-04-03 RX ORDER — FLUMAZENIL 0.1 MG/ML
0.2 INJECTION INTRAVENOUS
Status: DISCONTINUED | OUTPATIENT
Start: 2018-04-03 | End: 2018-04-03 | Stop reason: HOSPADM

## 2018-04-03 RX ORDER — HYDROCODONE BITARTRATE AND ACETAMINOPHEN 7.5; 325 MG/1; MG/1
1 TABLET ORAL
Qty: 25 TAB | Refills: 0 | Status: SHIPPED | OUTPATIENT
Start: 2018-04-03 | End: 2018-05-03 | Stop reason: ALTCHOICE

## 2018-04-03 RX ORDER — PROPOFOL 10 MG/ML
INJECTION, EMULSION INTRAVENOUS AS NEEDED
Status: DISCONTINUED | OUTPATIENT
Start: 2018-04-03 | End: 2018-04-03 | Stop reason: HOSPADM

## 2018-04-03 RX ORDER — BUPIVACAINE HYDROCHLORIDE AND EPINEPHRINE 5; 5 MG/ML; UG/ML
30 INJECTION, SOLUTION EPIDURAL; INTRACAUDAL; PERINEURAL ONCE
Status: CANCELLED | OUTPATIENT
Start: 2018-04-03 | End: 2018-04-03

## 2018-04-03 RX ORDER — LIDOCAINE HYDROCHLORIDE 10 MG/ML
0.1 INJECTION, SOLUTION EPIDURAL; INFILTRATION; INTRACAUDAL; PERINEURAL AS NEEDED
Status: DISCONTINUED | OUTPATIENT
Start: 2018-04-03 | End: 2018-04-03 | Stop reason: HOSPADM

## 2018-04-03 RX ORDER — MIDAZOLAM HYDROCHLORIDE 1 MG/ML
INJECTION, SOLUTION INTRAMUSCULAR; INTRAVENOUS AS NEEDED
Status: DISCONTINUED | OUTPATIENT
Start: 2018-04-03 | End: 2018-04-03 | Stop reason: HOSPADM

## 2018-04-03 RX ORDER — HYDROMORPHONE HYDROCHLORIDE 1 MG/ML
.25-1 INJECTION, SOLUTION INTRAMUSCULAR; INTRAVENOUS; SUBCUTANEOUS
Status: DISCONTINUED | OUTPATIENT
Start: 2018-04-03 | End: 2018-04-03

## 2018-04-03 RX ORDER — DIPHENHYDRAMINE HYDROCHLORIDE 50 MG/ML
12.5 INJECTION, SOLUTION INTRAMUSCULAR; INTRAVENOUS AS NEEDED
Status: DISCONTINUED | OUTPATIENT
Start: 2018-04-03 | End: 2018-04-03 | Stop reason: HOSPADM

## 2018-04-03 RX ORDER — DEXAMETHASONE SODIUM PHOSPHATE 4 MG/ML
INJECTION, SOLUTION INTRA-ARTICULAR; INTRALESIONAL; INTRAMUSCULAR; INTRAVENOUS; SOFT TISSUE AS NEEDED
Status: DISCONTINUED | OUTPATIENT
Start: 2018-04-03 | End: 2018-04-03 | Stop reason: HOSPADM

## 2018-04-03 RX ORDER — NALOXONE HYDROCHLORIDE 0.4 MG/ML
0.2 INJECTION, SOLUTION INTRAMUSCULAR; INTRAVENOUS; SUBCUTANEOUS
Status: DISCONTINUED | OUTPATIENT
Start: 2018-04-03 | End: 2018-04-03 | Stop reason: HOSPADM

## 2018-04-03 RX ORDER — FENTANYL CITRATE 50 UG/ML
INJECTION, SOLUTION INTRAMUSCULAR; INTRAVENOUS AS NEEDED
Status: DISCONTINUED | OUTPATIENT
Start: 2018-04-03 | End: 2018-04-03 | Stop reason: HOSPADM

## 2018-04-03 RX ORDER — MIDAZOLAM HYDROCHLORIDE 1 MG/ML
2 INJECTION, SOLUTION INTRAMUSCULAR; INTRAVENOUS
Status: DISCONTINUED | OUTPATIENT
Start: 2018-04-03 | End: 2018-04-03 | Stop reason: HOSPADM

## 2018-04-03 RX ORDER — ROCURONIUM BROMIDE 10 MG/ML
INJECTION, SOLUTION INTRAVENOUS AS NEEDED
Status: DISCONTINUED | OUTPATIENT
Start: 2018-04-03 | End: 2018-04-03 | Stop reason: HOSPADM

## 2018-04-03 RX ORDER — HYDROCODONE BITARTRATE AND ACETAMINOPHEN 7.5; 325 MG/1; MG/1
1 TABLET ORAL
Status: DISCONTINUED | OUTPATIENT
Start: 2018-04-03 | End: 2018-04-03 | Stop reason: HOSPADM

## 2018-04-03 RX ORDER — HYDROMORPHONE HYDROCHLORIDE 2 MG/ML
.25-1 INJECTION, SOLUTION INTRAMUSCULAR; INTRAVENOUS; SUBCUTANEOUS
Status: DISCONTINUED | OUTPATIENT
Start: 2018-04-03 | End: 2018-04-03 | Stop reason: HOSPADM

## 2018-04-03 RX ADMIN — EPHEDRINE SULFATE 10 MG: 50 INJECTION, SOLUTION INTRAVENOUS at 08:57

## 2018-04-03 RX ADMIN — CEFAZOLIN SODIUM 2 G: 1 INJECTION, POWDER, FOR SOLUTION INTRAMUSCULAR; INTRAVENOUS at 08:35

## 2018-04-03 RX ADMIN — DEXAMETHASONE SODIUM PHOSPHATE 8 MG: 4 INJECTION, SOLUTION INTRA-ARTICULAR; INTRALESIONAL; INTRAMUSCULAR; INTRAVENOUS; SOFT TISSUE at 08:30

## 2018-04-03 RX ADMIN — ONDANSETRON 4 MG: 2 INJECTION INTRAMUSCULAR; INTRAVENOUS at 08:30

## 2018-04-03 RX ADMIN — PROPOFOL 200 MG: 10 INJECTION, EMULSION INTRAVENOUS at 08:15

## 2018-04-03 RX ADMIN — MIDAZOLAM HYDROCHLORIDE 4 MG: 1 INJECTION, SOLUTION INTRAMUSCULAR; INTRAVENOUS at 08:09

## 2018-04-03 RX ADMIN — ALBUTEROL SULFATE 5 PUFF: 90 AEROSOL, METERED RESPIRATORY (INHALATION) at 09:06

## 2018-04-03 RX ADMIN — HYDROCODONE BITARTRATE AND ACETAMINOPHEN 1 TABLET: 7.5; 325 TABLET ORAL at 09:59

## 2018-04-03 RX ADMIN — MIDAZOLAM HYDROCHLORIDE 1 MG: 1 INJECTION, SOLUTION INTRAMUSCULAR; INTRAVENOUS at 08:12

## 2018-04-03 RX ADMIN — SODIUM CHLORIDE, POTASSIUM CHLORIDE, SODIUM LACTATE AND CALCIUM CHLORIDE: 600; 310; 30; 20 INJECTION, SOLUTION INTRAVENOUS at 08:59

## 2018-04-03 RX ADMIN — SODIUM CHLORIDE, POTASSIUM CHLORIDE, SODIUM LACTATE AND CALCIUM CHLORIDE 125 ML/HR: 600; 310; 30; 20 INJECTION, SOLUTION INTRAVENOUS at 06:53

## 2018-04-03 RX ADMIN — SUCCINYLCHOLINE CHLORIDE 100 MG: 20 INJECTION INTRAMUSCULAR; INTRAVENOUS at 08:15

## 2018-04-03 RX ADMIN — FENTANYL CITRATE 250 MCG: 50 INJECTION, SOLUTION INTRAMUSCULAR; INTRAVENOUS at 08:15

## 2018-04-03 RX ADMIN — ROCURONIUM BROMIDE 10 MG: 10 INJECTION, SOLUTION INTRAVENOUS at 08:15

## 2018-04-03 RX ADMIN — LIDOCAINE HYDROCHLORIDE 60 MG: 20 INJECTION, SOLUTION EPIDURAL; INFILTRATION; INTRACAUDAL; PERINEURAL at 08:15

## 2018-04-03 RX ADMIN — FAMOTIDINE 20 MG: 10 INJECTION INTRAVENOUS at 08:30

## 2018-04-03 NOTE — OP NOTES
[address] REPORT    Elaine Leong  MR#: [id number]  : 1955  ACCOUNT #: [de-identified]   DATE OF SERVICE: 2018    PREOPERATIVE DIAGNOSIS:  Ductal carcinoma in situ of the right breast.    POSTOPERATIVE DIAGNOSIS:  Ductal carcinoma in situ of the right breast.    PROCEDURE PERFORMED:  Right breast lumpectomy, with intraoperative ultrasound and placement of BioZorb 3-dimensional bioprosthesis. SURGEON:  Chase Angelo MD     ASSISTANT:  Not applicable. ANESTHESIA:  General.    SPECIMENS REMOVED:  Right breast mass. ESTIMATED BLOOD LOSS:  Minimal.    COMPLICATIONS:  None. IMPLANTS:  Not applicable. INDICATIONS:  The patient is a 77-year-old female with a core biopsy revealing ductal carcinoma in situ, and an MRI, which revealed a limited extent of disease. DESCRIPTION OF PROCEDURE:  After satisfactory induction of general LMA anesthesia, the patient was prepped and draped in sterile fashion. Intraoperative ultrasound was performed and the breast was marked. An incision was made just 0.5-1 cm above the inframammary fold in the lower outer quadrant of the right breast.  It was deepened through subcutaneous tissue with Bovie cautery, and a skin flap was raised superiorly up to the area of the clip. A standard lumpectomy was performed, keeping the clip in the center of the specimen, and the specimen was removed and oriented. A specimen ultrasound was performed, which revealed the presence of the clip within the specimen. A fiducial marker in the form of a 2 x 2 BioZorb 3-dimensional bioprosthesis was then placed and secured with interrupted 2-0 PDS suture for radiologic identification of the site of the cancer, for possible postoperative radiation therapy, and to provide scaffolding for scar tissue to facilitate a better cosmetic outcome. The parenchymal flap was then closed overtop the BioZorb with interrupted 3-0 Vicryl suture.   The subcutaneous tissues reapproximated with interrupted 3-0 Vicryl, and the skin was closed with running subcuticular 4-0 Monocryl after infiltration with 0.5% Marcaine. The patient tolerated the procedure well. No complications. She was taken to recovery in stable condition.       MD CHARLIE Hanson / MACI  D: 04/03/2018 09:07     T: 04/03/2018 14:23  JOB #: [id number]  CC: Ciro Torres MD

## 2018-04-03 NOTE — ANESTHESIA PREPROCEDURE EVALUATION
Anesthetic History   No history of anesthetic complications            Review of Systems / Medical History  Patient summary reviewed, nursing notes reviewed and pertinent labs reviewed    Pulmonary  Within defined limits                 Neuro/Psych   Within defined limits           Cardiovascular  Within defined limits                Exercise tolerance: >4 METS     GI/Hepatic/Renal  Within defined limits   GERD           Endo/Other  Within defined limits      Cancer     Other Findings   Comments: Breast ca  Hx Schatzki's ring           Physical Exam    Airway  Mallampati: II    Neck ROM: normal range of motion   Mouth opening: Normal     Cardiovascular  Regular rate and rhythm,  S1 and S2 normal,  no murmur, click, rub, or gallop  Rhythm: regular  Rate: normal         Dental  No notable dental hx       Pulmonary  Breath sounds clear to auscultation               Abdominal  GI exam deferred       Other Findings            Anesthetic Plan    ASA: 2  Anesthesia type: general          Induction: Intravenous  Anesthetic plan and risks discussed with: Patient

## 2018-04-03 NOTE — DISCHARGE INSTRUCTIONS
DISCHARGE SUMMARY from your Nurse      PATIENT INSTRUCTIONS    After general anesthesia or intravenous sedation, for 24 hours or while taking prescription Narcotics:  · Limit your activities  · Do not drive and operate hazardous machinery  · Do not make important personal or business decisions  · Do  not drink alcoholic beverages  · If you have not urinated within 8 hours after discharge, please contact your surgeon on call. Report the following to your surgeon:  · Excessive pain, swelling, redness or odor of or around the surgical area  · Temperature over 100.5  · Nausea and vomiting lasting longer than 4 hours or if unable to take medications  · Any signs of decreased circulation or nerve impairment to extremity: change in color, persistent  numbness, tingling, coldness or increase pain  · Any questions      COUGH AND DEEP BREATHE    Breathing deeply and coughing are very important exercises to do after surgery. Deep breathing and coughing open the little air tubes and air sacks in your lungs. You take deep breaths every day. You may not even notice - it is just something you do when you sigh or yawn. It is a natural exercise you do to keep these air passages open. After surgery, take deep breaths and cough, on purpose. DIRECTIONS:  · Take 10 to 15 slow deep breaths every hour while awake. · Breathe in deeply, and hold it for 2 seconds. · Exhale slowly through puckered lips, like blowing up a balloon. · After every 4th or 5th deep breath, hug your pillow to your chest or belly and give a hard, deep cough. Yes, it will probably hurt. But doing this exercise is a very important part of healing after surgery. Take your pain medicine to help you do this exercise without too much pain. Coughing and deep breathing help prevent bronchitis and pneumonia after surgery.   If you had chest or belly surgery, use a pillow as a \"hug buddy\" and hold it tightly to your chest or belly when you cough.       ANKLE PUMPS    Ankle pumps increase the circulation of oxygenated blood to your lower extremities and decrease your risk for circulation problems such as blood clots. They also stretch the muscles, tendons and ligaments in your foot and ankle, and prevent joint contracture in the ankle and foot, especially after surgeries on the legs. It is important to do ankle pump exercises regularly after surgery because immobility increases your risk for developing a blood clot. Your doctor may also have you take an Aspirin for the next few days as well. If your doctor did not ask you to take an Aspirin, consult with him before starting Aspirin therapy on your own. The exercise is quite simple. · Slowly point your foot forward, feeling the muscles on the top of your lower leg stretch, and hold this position for 5 seconds. · Next, pull your foot back toward you as far as possible, stretching the calf muscles, and hold that position for 5 seconds. · Repeat with the other foot. · Perform 10 repetitions every hour while awake for both ankles if possible (down and then up with the foot once is one repetition). You should feel gentle stretching of the muscles in your lower leg when doing this exercise. If you feel pain, or your range of motion is limited, don't push too hard. Only go the limit your joint and muscles will let you go. If you have increasing pain, progressively worsening leg warmth or swelling, STOP the exercise and call your doctor. MEDICATION AND   SIDE EFFECT GUIDE    The [employer] MEDICATION AND SIDE EFFECT GUIDE was provided to the PATIENT AND CARE PROVIDER. Information provided includes instruction about drug purpose and common side effects for the following medications:   · norco        These are general instructions for a healthy lifestyle:    *   Please give a list of your current medications to your Primary Care Provider.   * Please update this list whenever your medications are discontinued, doses are changed, or new medications (including over-the-counter products) are added. *   Please carry medication information at all times in case of emergency situations. About Smoking  No smoking / No tobacco products  Avoid exposure to second hand smoke     Surgeon General's Warning:  Quitting smoking now greatly reduces serious risk to your health. Obesity, smoking, and sedentary lifestyle greatly increases your risk for illness and disease. A healthy diet, regular physical exercise & weight monitoring are important for maintaining a healthy lifestyle. Congestive Heart Failure  You may be retaining fluid if you have a history of heart failure or if you experience any of the following symptoms:  Weight gain of 3 pounds or more overnight or 5 pounds in a week, increased swelling in your hands or feet or shortness of breath while lying flat in bed. Please call your doctor as soon as you notice any of these symptoms; do not wait until your next office visit. Recognize signs and symptoms of STROKE:  F -  Face looks uneven  A -  Arms unable to move or move evenly  S -  Speech slurred or non-existent  T -  Time-call 911 as soon as signs and symptoms begin-DO NOT go          back to bed or wait to see if you get better-TIME IS BRAIN. Warning Signs of HEART ATTACK   Call 911 if you have these symptoms:     Chest discomfort. Most heart attacks involve discomfort in the center of the chest that lasts more than a few minutes, or that goes away and comes back. It can feel like uncomfortable pressure, squeezing, fullness, or pain.  Discomfort in other areas of the upper body. Symptoms can include pain or discomfort in one or both arms, the back, neck, jaw, or stomach.  Shortness of breath with or without chest discomfort.  Other signs may include breaking out in a cold sweat, nausea, or lightheadedness.     Don't wait more than five minutes to call 911 - MINUTES MATTER! Fast action can save your life. Calling 911 is almost always the fastest way to get lifesaving treatment. Emergency Medical Services staff can begin treatment when they arrive -- up to an hour sooner than if someone gets to the hospital by car. The discharge information has been reviewed with the patient and daughter. Any questions and concerns from the patient and daughter have been addressed. The patient and daughter verbalized understanding. Other information in your discharge envelope:  []     PRESCRIPTIONS  []     PHYSICAL THERAPY PRESCRIPTION  []     APPOINTMENT CARDS  []     Regional Anesthesia Pamphlet for block or block with On-Q Catheter from   Anesthesia Service  []     Medical device information sheets/pamphlets from their    []     School/work excuse note. []     /parent work excuse note. The following personal items collected during your admission are returned to you:   Dental Appliance: Dental Appliances: None  Vision:    Hearing Aid:    Jewelry:    Clothing:    Other Valuables:    Valuables sent to safe:                                Discharge Instructions from Dr. Sanjeev Carlson    · I will call you with the pathology results, typically within 1 week from today. · You may shower, but no hot tubs, swimming pools, or baths until your incision is healed. · No heavy lifting with the affected extremity (nothing greater than 5 pounds), and limit its use for the next 4-5 days. · You may use an ice pack for comfort for the next couple of days, but do not place ice directly on the skin. Rather, use a towel or clothing to serve as a barrier between skin and ice to prevent injury. · If I placed a drain, follow the drain instructions provided, especially as you keep a record of the drain output. · Follow medication instructions carefully.   · Watch for signs of infection as listed below.  · Redness  · Swelling  · Drainage from the incision or from your nipple that appears infected  · Fever over 101 degrees for consecutive readings, or over 99.5 if you are currently undergoing chemotherapy. · Call our office (number is below) for a follow-up appointment. · If you have any problems, our phone number is [phone].

## 2018-04-03 NOTE — H&P
HISTORY OF PRESENT ILLNESS  Lynn Lomas is a 58 y.o. female. HPI  ESTABLISHED patient here to discuss plan of care for RIGHT breast cancer. Shannen Miller is doing well.  Denies pain.       02/06/18: RIGHT breast core bx of grade 2 DCIS. ER+90%      Breast MRI, 2/27/18, BIRADS 6          Past Medical History:   Diagnosis Date    AR (allergic rhinitis)      HLD (hyperlipidemia)      Post-menopausal AGE 48    Schatzki's ring      Vitamin D deficiency                 Past Surgical History:   Procedure Laterality Date    COLONOSCOPY   11/15/10     small EH, repeat 10 years; dr Manuel Alberto EGD   11/15/10     stricture ge junction (dilated), small hh          Social History            Social History    Marital status:        Spouse name: N/A    Number of children: N/A    Years of education: N/A          Occupational History    Not on file.             Social History Main Topics     Smoking status: Never Smoker     Smokeless tobacco: Never Used     Alcohol use 3.5 oz/week       7 Glasses of wine per week    Drug use: No     Sexual activity: Not on file            Other Topics Concern    Not on file      Social History Narrative                Current Outpatient Prescriptions on File Prior to Visit   Medication Sig Dispense Refill    ALPRAZolam (XANAX) 0.5 mg tablet Take 1/2 to one pill 30 to 45 mins prior to procedure. Indications: Breast Cancer 10 Tab 0    carbamide peroxide (DEBROX) 6.5 % otic solution Administer 5 Drops into each ear two (2) times a day.        rosuvastatin (CRESTOR) 10 mg tablet Take 1 Tab by mouth nightly. 90 Tab 3    esomeprazole (NEXIUM) 20 mg capsule Take 1 Cap by mouth daily.  90 Cap 3    psyllium (METAMUCIL) packet Take 1 Packet by mouth daily.        fluticasone (FLONASE) 50 mcg/actuation nasal spray 2 Sprays by Both Nostrils route daily.        loratadine (CLARITIN) 10 mg tablet Take 10 mg by mouth.        cholecalciferol, vitamin d3, (VITAMIN D) 1,000 unit tablet Take  by mouth daily.        aspirin 81 mg tablet Take  by mouth.        GLUCOSAMINE SULFATE (GLUCOSAMINE PO) Take  by mouth daily.        MULTIVITAMIN PO Take  by mouth.          No current facility-administered medications on file prior to visit.          No Known Allergies     OB History      Para Term  AB Living     3 3       3    SAB TAB Ectopic Molar Multiple Live Births                         Obstetric Comments     Menarche:  13. LMP: 52.  # of Children:  3. Age at Delivery of First Child:  22.   Hysterectomy/oophorectomy:  NO/NO. Breast Bx:  no.  Hx of Breast Feeding:  yes. BCP:  yes. Hormone therapy:  Yes  to '10.             ROS     Physical Exam   Cardiovascular: Normal rate, regular rhythm and normal heart sounds. Pulmonary/Chest: Breath sounds normal. Right breast exhibits no inverted nipple, no mass, no nipple discharge, no skin change and no tenderness. Left breast exhibits no inverted nipple, no mass, no nipple discharge, no skin change and no tenderness. Breasts are symmetrical.         ASSESSMENT and PLAN       Encounter Diagnoses   Name Primary?  Ductal carcinoma in situ (DCIS) of right breast Yes      Disposition: RIGHT lumpectomy at [facility] in the near future.      Pt here today with new diagnosis of RIGHT breast DCIS dx. A total of 45 minutes were spent face-to-face with the patient and her daughter during this encounter and over half of that time was spent on counseling and coordination of care. We discussed in depth the pathology results and need for surgery regarding MRI for extent of disease and surgical planning. Discussed  options with risks and complications of breast conservation with XRT options as well as mastectomy with reconstructive options, both unilateral and bilateral with inherent benefits and limitations. We also discussed the need and utility of post surgical medical therapy.  We also covered risk reduction strategies as well as post surgical therapies including hormonal therapy and its risks. Discussed my recommendation for lumpectomy. Patient would like to proceed with lumpectomy in the near future at [facility]- will schedule.     Informed that the need for XRT will be determined following risk profile report from [id number]. Discussed how if XRT is required, patient would get about 16 treatments over 4-6 weeks. Pt curious about intraoperative radiation therapy. Discussed that we do not to IORT here.      Discussed how risk of second breast cancer is 13% which is about normal risk. She should get annual 3D mammograms with dense breasts.

## 2018-04-03 NOTE — IP AVS SNAPSHOT
[facility] 
 
 
 [address] [place] [address] 
[phone] Patient: Tram Lunsford MRN: [id number] VVT:[id number]/29/4551 About your hospitalization You were admitted on:  April 3, 2018 You last received care in the:  [facility] PAC[facility] You were discharged on:  April 3, 2018 Why you were hospitalized Your primary diagnosis was:  Not on File Follow-up Information Follow up With Details Comments Contact Info Tim Archibald MD   [address] [facility] [address] [facility] [address] 
[phone] Your Scheduled Appointments Monday May 07, 2018  8:30 AM EDT  
POST OP with Sean Blancas MD  
[address] [facility] ([facility] [address]  
[phone] Discharge Orders None A check rachel indicates which time of day the medication should be taken. My Medications START taking these medications Instructions Each Dose to Equal  
 Morning Noon Evening Bedtime HYDROcodone-acetaminophen 7.5-325 mg per tablet Commonly known as:  Fredrkate Griffith Your last dose was: Your next dose is: Take 1 Tab by mouth every four (4) hours as needed for Pain. Max Daily Amount: 6 Tabs. 1 Tab CONTINUE taking these medications Instructions Each Dose to Equal  
 Morning Noon Evening Bedtime  
 aspirin 81 mg tablet Your last dose was: Your next dose is: Take 81 mg by mouth Daily (before breakfast). 81 mg DEBROX 6.5 % otic solution Generic drug:  carbamide peroxide Your last dose was: Your next dose is:    
   
   
 Administer 5 Drops into each ear as needed. 5 Drop  
    
   
   
   
  
 esomeprazole 10 mg granules for oral suspension Commonly known as:  Elysia Hernandez Your last dose was: Your next dose is: Take 10 mg by mouth daily. 10 mg  
    
   
   
   
  
 GLUCOSAMINE PO Your last dose was: Your next dose is: Take 1 Tab by mouth daily. 1 Tab  
    
   
   
   
  
 loratadine 10 mg tablet Commonly known as:  Verita Jackeline Your last dose was: Your next dose is: Take 10 mg by mouth daily as needed. 10 mg  
    
   
   
   
  
 MUCINEX 600 mg ER tablet Generic drug:  guaiFENesin ER Your last dose was: Your next dose is: Take 600 mg by mouth nightly. 600 mg MULTIVITAMIN PO Your last dose was: Your next dose is: Take 1 Tab by mouth Daily (before breakfast). 1 Tab  
    
   
   
   
  
 rosuvastatin 10 mg tablet Commonly known as:  CRESTOR Your last dose was: Your next dose is: Take 1 Tab by mouth nightly. 10 mg  
    
   
   
   
  
 VITAMIN D3 1,000 unit tablet Generic drug:  cholecalciferol Your last dose was: Your next dose is: Take 1,000 Units by mouth Three (3) times a week. 1000 Units Where to Get Your Medications Information on where to get these meds will be given to you by the nurse or doctor. ! Ask your nurse or doctor about these medications HYDROcodone-acetaminophen 7.5-325 mg per tablet Opioid Education Prescription Opioids: What You Need to Know: 
 
Prescription opioids can be used to help relieve moderate-to-severe pain and are often prescribed following a surgery or injury, or for certain health conditions. These medications can be an important part of treatment but also come with serious risks. Opioids are strong pain medicines. Examples include hydrocodone, oxycodone, fentanyl, and morphine. Heroin is an example of an illegal opioid.   It is important to work with your health care provider to make sure you are getting the safest, most effective care. WHAT ARE THE RISKS AND SIDE EFFECTS OF OPIOID USE? Prescription opioids carry serious risks of addiction and overdose, especially with prolonged use. An opioid overdose, often marked by slow breathing, can cause sudden death. The use of prescription opioids can have a number of side effects as well, even when taken as directed. · Tolerance-meaning you might need to take more of a medication for the same pain relief · Physical dependence-meaning you have symptoms of withdrawal when the medication is stopped. Withdrawal symptoms can include nausea, sweating, chills, diarrhea, stomach cramps, and muscle aches. Withdrawal can last up to several weeks, depending on which drug you took and how long you took it. · Increased sensitivity to pain · Constipation · Nausea, vomiting, and dry mouth · Sleepiness and dizziness · Confusion · Depression · Low levels of testosterone that can result in lower sex drive, energy, and strength · Itching and sweating RISKS ARE GREATER WITH:      
· History of drug misuse, substance use disorder, or overdose · Mental health conditions (such as depression or anxiety) · Sleep apnea · Older age (72 years or older) · Pregnancy Avoid alcohol while taking prescription opioids. Also, unless specifically advised by your health care provider, medications to avoid include: · Benzodiazepines (such as Xanax or Valium) · Muscle relaxants (such as Soma or Flexeril) · Hypnotics (such as Ambien or Lunesta) · Other prescription opioids KNOW YOUR OPTIONS Talk to your health care provider about ways to manage your pain that don't involve prescription opioids. Some of these options may actually work better and have fewer risks and side effects. Options may include: 
· Pain relievers such as acetaminophen, ibuprofen, and naproxen · Some medications that are also used for depression or seizures · Physical therapy and exercise · Counseling to help patients learn how to cope better with triggers of pain and stress. · Application of heat or cold compress · Massage therapy · Relaxation techniques Be Informed Make sure you know the name of your medication, how much and how often to take it, and its potential risks & side effects. IF YOU ARE PRESCRIBED OPIOIDS FOR PAIN: 
· Never take opioids in greater amounts or more often than prescribed. Remember the goal is not to be pain-free but to manage your pain at a tolerable level. · Follow up with your primary care provider to: · Work together to create a plan on how to manage your pain. · Talk about ways to help manage your pain that don't involve prescription opioids. · Talk about any and all concerns and side effects. · Help prevent misuse and abuse. · Never sell or share prescription opioids · Help prevent misuse and abuse. · Store prescription opioids in a secure place and out of reach of others (this may include visitors, children, friends, and family). · Safely dispose of unused/unwanted prescription opioids: Find your community drug take-back program or your pharmacy mail-back program, or flush them down the toilet, following guidance from the Food and Drug Administration (www.fda.gov/Drugs/ResourcesForYou). · Visit www.cdc.gov/drugoverdose to learn about the risks of opioid abuse and overdose. · If you believe you may be struggling with addiction, tell your health care provider and ask for guidance or call [address] [place] [address] [employer] at 2-585-276-OGCV. Discharge Instructions DISCHARGE SUMMARY from your Nurse PATIENT INSTRUCTIONS After general anesthesia or intravenous sedation, for 24 hours or while taking prescription Narcotics: · Limit your activities · Do not drive and operate hazardous machinery · Do not make important personal or business decisions · Do  not drink alcoholic beverages · If you have not urinated within 8 hours after discharge, please contact your surgeon on call. Report the following to your surgeon: 
· Excessive pain, swelling, redness or odor of or around the surgical area · Temperature over 100.5 · Nausea and vomiting lasting longer than 4 hours or if unable to take medications · Any signs of decreased circulation or nerve impairment to extremity: change in color, persistent  numbness, tingling, coldness or increase pain · Any questions [address] Breathing deeply and coughing are very important exercises to do after surgery. Deep breathing and coughing open the little air tubes and air sacks in your lungs. You take deep breaths every day. You may not even notice - it is just something you do when you sigh or yawn. It is a natural exercise you do to keep these air passages open. After surgery, take deep breaths and cough, on purpose. DIRECTIONS: 
· Take 10 to 15 slow deep breaths every hour while awake. · Breathe in deeply, and hold it for 2 seconds. · Exhale slowly through puckered lips, like blowing up a balloon. · After every 4th or 5th deep breath, hug your pillow to your chest or belly and give a hard, deep cough. Yes, it will probably hurt. But doing this exercise is a very important part of healing after surgery. Take your pain medicine to help you do this exercise without too much pain. Coughing and deep breathing help prevent bronchitis and pneumonia after surgery. If you had chest or belly surgery, use a pillow as a \"hug buddy\" and hold it tightly to your chest or belly when you cough. ANKLE PUMPS Ankle pumps increase the circulation of oxygenated blood to your lower extremities and decrease your risk for circulation problems such as blood clots. They also stretch the muscles, tendons and ligaments in your foot and ankle, and prevent joint contracture in the ankle and foot, especially after surgeries on the legs. It is important to do ankle pump exercises regularly after surgery because immobility increases your risk for developing a blood clot. Your doctor may also have you take an Aspirin for the next few days as well. If your doctor did not ask you to take an Aspirin, consult with him before starting Aspirin therapy on your own. The exercise is quite simple. · Slowly point your foot forward, feeling the muscles on the top of your lower leg stretch, and hold this position for 5 seconds. · Next, pull your foot back toward you as far as possible, stretching the calf muscles, and hold that position for 5 seconds. · Repeat with the other foot. · Perform 10 repetitions every hour while awake for both ankles if possible (down and then up with the foot once is one repetition). You should feel gentle stretching of the muscles in your lower leg when doing this exercise. If you feel pain, or your range of motion is limited, don't push too hard. Only go the limit your joint and muscles will let you go. If you have increasing pain, progressively worsening leg warmth or swelling, STOP the exercise and call your doctor. MEDICATION AND  
SIDE EFFECT GUIDE The Miley Deshpande 55 EFFECT GUIDE was provided to the PATIENT AND CARE PROVIDER. Information provided includes instruction about drug purpose and common side effects for the following medications:  
· norco 
 
 
 
These are general instructions for a healthy lifestyle: *   Please give a list of your current medications to your Primary Care Provider. *   Please update this list whenever your medications are discontinued, doses are changed, or new medications (including over-the-counter products) are added. *   Please carry medication information at all times in case of emergency situations. About Smoking No smoking / No tobacco products Avoid exposure to second hand smoke Surgeon General's Warning:  Quitting smoking now greatly reduces serious risk to your health. Obesity, smoking, and sedentary lifestyle greatly increases your risk for illness and disease. A healthy diet, regular physical exercise & weight monitoring are important for maintaining a healthy lifestyle. Congestive Heart Failure You may be retaining fluid if you have a history of heart failure or if you experience any of the following symptoms:  Weight gain of 3 pounds or more overnight or 5 pounds in a week, increased swelling in your hands or feet or shortness of breath while lying flat in bed. Please call your doctor as soon as you notice any of these symptoms; do not wait until your next office visit. Recognize signs and symptoms of STROKE: 
F -  Face looks uneven A -  Arms unable to move or move evenly S -  Speech slurred or non-existent T -  Time-call 911 as soon as signs and symptoms begin-DO NOT go  
       back to bed or wait to see if you get better-TIME IS BRAIN. Warning Signs of HEART ATTACK Call 911 if you have these symptoms: 
 
? Chest discomfort. Most heart attacks involve discomfort in the center of the chest that lasts more than a few minutes, or that goes away and comes back. It can feel like uncomfortable pressure, squeezing, fullness, or pain. ? Discomfort in other areas of the upper body. Symptoms can include pain or discomfort in one or both arms, the back, neck, jaw, or stomach. ? Shortness of breath with or without chest discomfort. ? Other signs may include breaking out in a cold sweat, nausea, or lightheadedness. Don't wait more than five minutes to call [address]! Fast action can save your life.  Calling 911 is almost always the fastest way to get lifesaving treatment. Emergency Medical Services staff can begin treatment when they arrive  up to an hour sooner than if someone gets to the hospital by car. The discharge information has been reviewed with the patient and daughter. Any questions and concerns from the patient and daughter have been addressed. The patient and daughter verbalized understanding. Other information in your discharge envelope: 
[]     PRESCRIPTIONS []     PHYSICAL THERAPY PRESCRIPTION 
[]     APPOINTMENT CARDS []     Regional Anesthesia Pamphlet for block or block with On-Q Catheter from   Anesthesia Service []     Medical device information sheets/pamphlets from their   
[]     School/work excuse note. []     /parent work excuse note. The following personal items collected during your admission are returned to you:  
Dental Appliance: Dental Appliances: None Vision:   
Hearing Aid:   
Jewelry:   
Clothing:   
Other Valuables:   
Valuables sent to safe:                             
 
Discharge Instructions from Dr. Ariana Peralta · I will call you with the pathology results, typically within 1 week from today. · You may shower, but no hot tubs, swimming pools, or baths until your incision is healed. · No heavy lifting with the affected extremity (nothing greater than 5 pounds), and limit its use for the next 4-5 days. · You may use an ice pack for comfort for the next couple of days, but do not place ice directly on the skin. Rather, use a towel or clothing to serve as a barrier between skin and ice to prevent injury. · If I placed a drain, follow the drain instructions provided, especially as you keep a record of the drain output. · Follow medication instructions carefully. · Watch for signs of infection as listed below. · Redness · Swelling · Drainage from the incision or from your nipple that appears infected · Fever over 101 degrees for consecutive readings, or over 99.5 if you are currently undergoing chemotherapy. · Call our office (number is below) for a follow-up appointment. · If you have any problems, our phone number is [phone]. Introducing [facility]! Dear Jong Lin: Thank you for requesting a [employer] account. Our records indicate that you already have an active CertiRx account. You can access your account anytime at [email or web address] Did you know that you can access your hospital and ER discharge instructions at any time in [employer]? You can also review all of your test results from your hospital stay or ER visit. Additional Information If you have questions, please visit the Frequently Asked Questions section of the Certi[employer] website at [email or web address] Remember, CertiRx is NOT to be used for urgent needs. For medical emergencies, dial 911. Now available from your iPhone and Android! Introducing [employer] As a Brad Lin patient, I wanted to make you aware of our electronic visit tool called Silvino Etelvina. [employer] 24/7 allows you to connect within minutes with a medical provider 24 hours a day, seven days a week via a mobile device or tablet or logging into a secure website from your computer. You can access [employer] Main from anywhere in the [place]. A virtual visit might be right for you when you have a simple condition and feel like you just dont want to get out of bed, or cant get away from work for an appointment, when your regular [employer] provider is not available (evenings, weekends or holidays), or when youre out of town and need minor care. Electronic visits cost only $49 and if the [employer] 24/7 provider determines a prescription is needed to treat your condition, one can be electronically transmitted to a nearby pharmacy*. Please take a moment to enroll today if you have not already done so. The enrollment process is free and takes just a few minutes. To enroll, please download the [employer] 24/7 kin to your tablet or phone, or visit [email or web address] to enroll on your computer. And, as an [address] [place] [address] patient with a Flutter account, the results of your visits will be scanned into your electronic medical record and your primary care provider will be able to view the scanned results. We urge you to continue to see your regular [employer] provider for your ongoing medical care. And while your primary care provider may not be the one available when you seek a [employer] virtual visit, the peace of mind you get from getting a real diagnosis real time can be priceless. For more information on [employer], view our Frequently Asked Questions (FAQs) at [email or web address]. Sincerely, 
 
Adriana Latham MD 
Chief Medical Officer [address] [place] [address] *:  certain medications cannot be prescribed via [employer] Providers Seen During Your Hospitalization Provider Specialty Primary office phone Stuart Zurita MD Breast Surgery [phone] Your Primary Care Physician (PCP) Primary Care Physician Office Phone Office Fax Nery Meneses [phone] You are allergic to the following No active allergies Recent Documentation Height Weight BMI OB Status Smoking Status 1.753 m 102.9 kg 33.5 kg/m2 Postmenopausal Never Smoker Emergency Contacts Name Discharge Info Relation Home Work Mobile Relatient CAREGIVER [3] Child [2] [phone] Alex Sewell DISCHARGE CAREGIVER [3] Child [2] [phone] Patient Belongings The following personal items are in your possession at time of discharge: 
  Dental Appliances: None Please provide this summary of care documentation to your next provider. Signatures-by signing, you are acknowledging that this After Visit Summary has been reviewed with you and you have received a copy. Patient Signature:  ____________________________________________________________ Date:  ____________________________________________________________  
  
[facility] Provider Signature:  ____________________________________________________________ Date:  ____________________________________________________________

## 2018-04-03 NOTE — ANESTHESIA POSTPROCEDURE EVALUATION
Post-Anesthesia Evaluation and Assessment    Patient: Jaelyn Fairchild MRN: [id number]  SSN: [id number]    YOB: 1955  Age: 58 y.o. Sex: female       Cardiovascular Function/Vital Signs  Visit Vitals    BP (!) 149/91    Pulse 93    Temp 36.5 °C (97.7 °F)    Resp 20    Ht 5' 9\" (1.753 m)    Wt 102.9 kg (226 lb 13.7 oz)    SpO2 99%    BMI 33.5 kg/m2       Patient is status post general anesthesia for Procedure(s):  RIGHT BREAST LUMPECTOMY WITH ULTRASOUND . Nausea/Vomiting: None    Postoperative hydration reviewed and adequate. Pain:  Pain Scale 1: Numeric (0 - 10) (04/03/18 0924)  Pain Intensity 1: 0 (04/03/18 0924)   Managed    Neurological Status:   Neuro (WDL): Within Defined Limits (04/03/18 0924)   At baseline    Mental Status and Level of Consciousness: Arousable    Pulmonary Status:   O2 Device: Room air (04/03/18 0935)   Adequate oxygenation and airway patent    Complications related to anesthesia: None    Post-anesthesia assessment completed.  No concerns    Signed By: Ismael Sawyer MD     April 3, 2018

## 2018-04-03 NOTE — BRIEF OP NOTE
BRIEF OPERATIVE NOTE    Date of Procedure: 4/3/2018   Preoperative Diagnosis: DUCTAL CARCINOMA IN SITU RIGHT BREAST   Postoperative Diagnosis: DUCTAL CARCINOMA IN SITU RIGHT BREAST     Procedure(s):  RIGHT BREAST LUMPECTOMY WITH ULTRASOUND   Surgeon(s) and Role:     * Xiomara Gutierrez MD - Primary         Assistant Staff: None      Surgical Staff:  Circ-1: Navarro Meza RN  Scrub RN-1: Damian Coronel RN  Surg Asst-1: Avery Saleh  Event Time In   Incision Start 1384   Incision Close 0904     Anesthesia: General   Estimated Blood Loss: minimal  Specimens:   ID Type Source Tests Collected by Time Destination   1 : right breast lumpectomy; marked single- anterior, short double- inferior, long double- lateral Preservative Breast  Xiomara Gutierrez MD 3/7/8455 7031 Pathology      Findings: spec sono pos clip, 2 x 2 Biozorb   Complications: none  Implants: * No implants in log *

## 2018-04-05 ENCOUNTER — TELEPHONE (OUTPATIENT)
Dept: SURGERY | Age: 63
End: 2018-04-05

## 2018-04-06 ENCOUNTER — TELEPHONE (OUTPATIENT)
Dept: SURGERY | Age: 63
End: 2018-04-06

## 2018-04-06 NOTE — TELEPHONE ENCOUNTER
Returned patient's call. She had a question about her breast being red. The first couple of days post-op the breast was very purple, and it has now turned red. She is not having any increasing pain, only has soreness like a bruise. Is not having any fever, and she feels well. I told her that this was probably part of the normal healing process. I told her to call us if she has any increasing pain, increasing redness, swelling, fever or just feels like she is not heading in the right direction as far as getting better. We discussed her path briefly. I discussed margins and what they are. Discussed DCISionRT and why this is ordered. She is excited about this because she is not anxious about having XRT. She was very appreciative of the return call. She will call with any further questions/problems.

## 2018-04-09 ENCOUNTER — DOCUMENTATION ONLY (OUTPATIENT)
Dept: SURGERY | Age: 63
End: 2018-04-09

## 2018-04-10 NOTE — PROGRESS NOTES
Faxed TRF to [facility] to order [phone] [address] per order of Dr. Kyle Chery. Insurance letter mailed to patient.

## 2018-04-16 ENCOUNTER — TELEPHONE (OUTPATIENT)
Dept: SURGERY | Age: 63
End: 2018-04-16

## 2018-04-16 NOTE — TELEPHONE ENCOUNTER
Called today to see if the results of her DCISionRT are back yet. I told her that they should be back this week. Continues to have some post-op tenderness, she says mostly around the incision. I offered her an appointment today with Dr. Fayette Severance, but she says that she will wait another day or so to see if this improves. I explained that she may have a seroma and told her she may benefit from drainage. She understands and will call back if she changes her mind.

## 2018-04-18 ENCOUNTER — TELEPHONE (OUTPATIENT)
Dept: SURGERY | Age: 63
End: 2018-04-18

## 2018-04-18 NOTE — TELEPHONE ENCOUNTER
The patient called with complaints of continual breast discomfort and swelling. I suggested an appointment to see Dr. Ariana Peralta. The patient agreed and I will have the PSR call her back to set up an appointment. In the meanwhile I suggested continuing using ice and she may take ibuprofen for the discomfort. The patient was appreciative.

## 2018-04-20 ENCOUNTER — OFFICE VISIT (OUTPATIENT)
Dept: SURGERY | Age: 63
End: 2018-04-20

## 2018-04-20 VITALS
BODY MASS INDEX: 33.47 KG/M2 | DIASTOLIC BLOOD PRESSURE: 73 MMHG | HEART RATE: 68 BPM | HEIGHT: 69 IN | WEIGHT: 226 LBS | SYSTOLIC BLOOD PRESSURE: 154 MMHG

## 2018-04-20 DIAGNOSIS — D05.11 DUCTAL CARCINOMA IN SITU (DCIS) OF RIGHT BREAST: Primary | ICD-10-CM

## 2018-04-20 NOTE — PATIENT INSTRUCTIONS
Breast Cancer: Care Instructions  Your Care Instructions    Breast cancer occurs when abnormal cells grow out of control in the breast. These cancer cells can spread within the breast, to nearby lymph nodes and other tissues, and to other parts of the body. Being treated for cancer can weaken your body, and you may feel very tired. Get the rest your body needs so you can feel better. Finding out that you have cancer is scary. You may feel many emotions and may need some help coping. Seek out family, friends, and counselors for support. You also can do things at home to make yourself feel better while you go through treatment. Call the [employer] (3-917.112.2867) or visit its website at NetPro[employer] delicious for more information. Follow-up care is a key part of your treatment and safety. Be sure to make and go to all appointments, and call your doctor if you are having problems. It's also a good idea to know your test results and keep a list of the medicines you take. How can you care for yourself at home? · Take your medicines exactly as prescribed. Call your doctor if you think you are having a problem with your medicine. You may get medicine for nausea and vomiting if you have these side effects. · Follow your doctor's instructions to relieve pain. Pain from cancer and surgery can almost always be controlled. Use pain medicine when you first notice pain, before it becomes severe. · Eat healthy food. If you do not feel like eating, try to eat food that has protein and extra calories to keep up your strength and prevent weight loss. Drink liquid meal replacements for extra calories and protein. Try to eat your main meal early. · Get some physical activity every day, but do not get too tired. Keep doing the hobbies you enjoy as your energy allows. · Do not smoke. Smoking can make your cancer worse. If you need help quitting, talk to your doctor about stop-smoking programs and medicines.  These can increase your chances of quitting for good. · Take steps to control your stress and workload. Learn relaxation techniques. ¨ Share your feelings. Stress and tension affect our emotions. By expressing your feelings to others, you may be able to understand and cope with them. ¨ Consider joining a support group. Talking about a problem with your spouse, a good friend, or other people with similar problems is a good way to reduce tension and stress. ¨ Express yourself through art. Try writing, crafts, dance, or art to relieve stress. Some dance, writing, or art groups may be available just for people who have cancer. ¨ Be kind to your body and mind. Getting enough sleep, eating a healthy diet, and taking time to do things you enjoy can contribute to an overall feeling of balance in your life and can help reduce stress. ¨ Get help if you need it. Discuss your concerns with your doctor or counselor. · If you are vomiting or have diarrhea:  ¨ Drink plenty of fluids (enough so that your urine is light yellow or clear like water) to prevent dehydration. Choose water and other caffeine-free clear liquids. If you have kidney, heart, or liver disease and have to limit fluids, talk with your doctor before you increase the amount of fluids you drink. ¨ When you are able to eat, try clear soups, mild foods, and liquids until all symptoms are gone for 12 to 48 hours. Other good choices include dry toast, crackers, cooked cereal, and gelatin dessert, such as Jell-O.  · If you have not already done so, prepare a list of advance directives. Advance directives are instructions to your doctor and family members about what kind of care you want if you become unable to speak or express yourself. When should you call for help? Call 911 anytime you think you may need emergency care. For example, call if:  ? · You passed out (lost consciousness). ?Call your doctor now or seek immediate medical care if:  ? · You have a fever. ? · You have abnormal bleeding. ? · You think you have an infection. ? · You have new or worse pain. ? · You have new symptoms, such as a cough, belly pain, vomiting, diarrhea, or a rash. ? Watch closely for changes in your health, and be sure to contact your doctor if:  ? · You are much more tired than usual.   ? · You have swollen glands in your armpits, groin, or neck. ? · You do not get better as expected. Where can you learn more? Go to http://gamal-tri.info/. Enter V321 in the search box to learn more about \"Breast Cancer: Care Instructions. \"  Current as of: May 12, 2017  Content Version: 11.4  © 4674-3651 [employer]. Care instructions adapted under license by [employer] (which disclaims liability or warranty for this information). If you have questions about a medical condition or this instruction, always ask your healthcare professional. [address] any warranty or liability for your use of this information.

## 2018-04-20 NOTE — PROGRESS NOTES
HISTORY OF PRESENT ILLNESS  Valdez Murguia is a 58 y.o. female. HPI  ESTABLISHED patient here for post op follow up RIGHT breast cancer, s/p lumpectomy. Having pain of 7/10 to her breast incision. She is unsure if it is normal post op pain or not. Denies swelling and redness at the incision. Incision is dry and intact. 02/06/18: RIGHT breast core bx of grade 2 DCIS. ER+90%     04/03/18: RIGHT breast lumpectomy with placement of BioZorb. PATH: DCIS, grade III, 4mm. PATHOLOGIC STAGE CLASSIFICATION: Primary Tumor (pT): pTis (DCIS), Category (pN): pNX .   ROS    Physical Exam    ASSESSMENT and PLAN  {ASSESSMENT/PLAN:08319}

## 2018-04-20 NOTE — PROGRESS NOTES
HISTORY OF PRESENT ILLNESS  Slick Jung is a 58 y.o. female. HPI  ESTABLISHED patient here for post op follow up RIGHT breast cancer, s/p lumpectomy. Having pain of 7/10 to her breast incision. She is unsure if it is normal post op pain or not. Denies swelling and redness at the incision. Incision is dry and intact.          02/06/18: RIGHT breast core bx of grade 2 DCIS. ER+90%      04/03/18: RIGHT breast lumpectomy with placement of BioZorb. PATH: DCIS, grade III, 4mm. PATHOLOGIC STAGE CLASSIFICATION: Primary Tumor (pT): pTis (DCIS), Category (pN): pNX . Past Medical History:   Diagnosis Date    Adverse effect of anesthesia     Pt stated \"it takes alot of medication to put her under because she is a redhead\"    AR (allergic rhinitis)     [facility]    DCIS Right Breast      GERD (gastroesophageal reflux disease)     HLD (hyperlipidemia)     Post-menopausal AGE 48    Schatzki's ring     Vitamin D deficiency        Past Surgical History:   Procedure Laterality Date    COLONOSCOPY  11/15/10    small EH, repeat 10 years; dr Clarence Weathers EGD  11/15/10    stricture ge junction (dilated), small hh     HX BREAST LUMPECTOMY Right 4/3/2018    RIGHT BREAST LUMPECTOMY WITH ULTRASOUND  performed by Oriana Arauz MD at [address] HX COLONOSCOPY         Social History     Social History    Marital status:      Spouse name: N/A    Number of children: N/A    Years of education: N/A     Occupational History    Not on file. Social History Main Topics    Smoking status: Never Smoker    Smokeless tobacco: Never Used    Alcohol use 3.5 oz/week     7 Glasses of wine per week    Drug use: No    Sexual activity: Not on file     Other Topics Concern    Not on file     Social History Narrative       Current Outpatient Prescriptions on File Prior to Visit   Medication Sig Dispense Refill    guaiFENesin ER (MUCINEX) 600 mg ER tablet Take 600 mg by mouth nightly.  esomeprazole (NEXIUM) 10 mg granules for oral suspension Take 10 mg by mouth daily.  rosuvastatin (CRESTOR) 10 mg tablet Take 1 Tab by mouth nightly. 90 Tab 3    loratadine (CLARITIN) 10 mg tablet Take 10 mg by mouth daily as needed.  cholecalciferol, vitamin d3, (VITAMIN D) 1,000 unit tablet Take 1,000 Units by mouth Three (3) times a week.  aspirin 81 mg tablet Take 81 mg by mouth Daily (before breakfast).  GLUCOSAMINE SULFATE (GLUCOSAMINE PO) Take 1 Tab by mouth daily.  MULTIVITAMIN PO Take 1 Tab by mouth Daily (before breakfast).  HYDROcodone-acetaminophen (NORCO) 7.5-325 mg per tablet Take 1 Tab by mouth every four (4) hours as needed for Pain. Max Daily Amount: 6 Tabs. 25 Tab 0     No current facility-administered medications on file prior to visit. No Known Allergies    OB History      Para Term  AB Living    3 3    3    SAB TAB Ectopic Molar Multiple Live Births                 Obstetric Comments    Menarche:  15. LMP: 52.  # of Children:  3. Age at Delivery of First Child:  22.   Hysterectomy/oophorectomy:  NO/NO. Breast Bx:  no.  Hx of Breast Feeding:  yes. BCP:  yes. Hormone therapy:  Yes '08 to '10. ROS    Physical Exam   Cardiovascular: Normal rate, regular rhythm and normal heart sounds. Pulmonary/Chest: Breath sounds normal. Right breast exhibits no inverted nipple, no mass, no nipple discharge, no skin change and no tenderness. Left breast exhibits no inverted nipple, no mass, no nipple discharge, no skin change and no tenderness. Breasts are symmetrical.   Lymphadenopathy:        Right cervical: No superficial cervical, no deep cervical and no posterior cervical adenopathy present. Left cervical: No superficial cervical, no deep cervical and no posterior cervical adenopathy present. She has no axillary adenopathy. Right axillary: No pectoral and no lateral adenopathy present.         Left axillary: No pectoral and no lateral adenopathy present. ASSESSMENT and PLAN    ICD-10-CM ICD-9-CM    1. Ductal carcinoma in situ (DCIS) of right breast D05.11 233.0         Pt presents for f/u s/p RIGHT breast lumpectomy on 04/03/18, reports RIGHT discomfort and swelling, including \"shooting\" pains or \"zingers. \" Assured her that these are normal and expected to resolve. Suggested she wear a supportive bra and apply ice PRN. We reviewed her DCISionRT results, which came back with low risk results of 9% total risk, and she may not need XRT. I will refer her to radiation and oncology for further consultation. Will also refer to medical oncology to discuss Tamoxifen. F/U mammo and appointment in 6 months. This plan was reviewed with the patient and patient agrees. All questions were answered.     Written by Masha Curiel, as dictated by Dr. Christine Thurman MD.

## 2018-04-23 NOTE — COMMUNICATION BODY
HISTORY OF PRESENT ILLNESS  Urszula Ross is a 58 y.o. female. HPI  ESTABLISHED patient here for post op follow up RIGHT breast cancer, s/p lumpectomy. Having pain of 7/10 to her breast incision. She is unsure if it is normal post op pain or not. Denies swelling and redness at the incision. Incision is dry and intact.          02/06/18: RIGHT breast core bx of grade 2 DCIS. ER+90%      04/03/18: RIGHT breast lumpectomy with placement of BioZorb. PATH: DCIS, grade III, 4mm. PATHOLOGIC STAGE CLASSIFICATION: Primary Tumor (pT): pTis (DCIS), Category (pN): pNX . Past Medical History:   Diagnosis Date    Adverse effect of anesthesia     Pt stated \"it takes alot of medication to put her under because she is a redhead\"    AR (allergic rhinitis)     [facility]    DCIS Right Breast      GERD (gastroesophageal reflux disease)     HLD (hyperlipidemia)     Post-menopausal AGE 48    Schatzki's ring     Vitamin D deficiency        Past Surgical History:   Procedure Laterality Date    COLONOSCOPY  11/15/10    small EH, repeat 10 years; dr Krause [place] EGD  11/15/10    stricture ge junction (dilated), small hh     HX BREAST LUMPECTOMY Right 4/3/2018    RIGHT BREAST LUMPECTOMY WITH ULTRASOUND  performed by Sandhya Brooks MD at [address] HX COLONOSCOPY         Social History     Social History    Marital status:      Spouse name: N/A    Number of children: N/A    Years of education: N/A     Occupational History    Not on file. Social History Main Topics    Smoking status: Never Smoker    Smokeless tobacco: Never Used    Alcohol use 3.5 oz/week     7 Glasses of wine per week    Drug use: No    Sexual activity: Not on file     Other Topics Concern    Not on file     Social History Narrative       Current Outpatient Prescriptions on File Prior to Visit   Medication Sig Dispense Refill    guaiFENesin ER (MUCINEX) 600 mg ER tablet Take 600 mg by mouth nightly.  esomeprazole (NEXIUM) 10 mg granules for oral suspension Take 10 mg by mouth daily.  rosuvastatin (CRESTOR) 10 mg tablet Take 1 Tab by mouth nightly. 90 Tab 3    loratadine (CLARITIN) 10 mg tablet Take 10 mg by mouth daily as needed.  cholecalciferol, vitamin d3, (VITAMIN D) 1,000 unit tablet Take 1,000 Units by mouth Three (3) times a week.  aspirin 81 mg tablet Take 81 mg by mouth Daily (before breakfast).  GLUCOSAMINE SULFATE (GLUCOSAMINE PO) Take 1 Tab by mouth daily.  MULTIVITAMIN PO Take 1 Tab by mouth Daily (before breakfast).  HYDROcodone-acetaminophen (NORCO) 7.5-325 mg per tablet Take 1 Tab by mouth every four (4) hours as needed for Pain. Max Daily Amount: 6 Tabs. 25 Tab 0     No current facility-administered medications on file prior to visit. No Known Allergies    OB History      Para Term  AB Living    3 3    3    SAB TAB Ectopic Molar Multiple Live Births                 Obstetric Comments    Menarche:  15. LMP: 52.  # of Children:  3. Age at Delivery of First Child:  22.   Hysterectomy/oophorectomy:  NO/NO. Breast Bx:  no.  Hx of Breast Feeding:  yes. BCP:  yes. Hormone therapy:  Yes '08 to '10. ROS    Physical Exam   Cardiovascular: Normal rate, regular rhythm and normal heart sounds. Pulmonary/Chest: Breath sounds normal. Right breast exhibits no inverted nipple, no mass, no nipple discharge, no skin change and no tenderness. Left breast exhibits no inverted nipple, no mass, no nipple discharge, no skin change and no tenderness. Breasts are symmetrical.   Lymphadenopathy:        Right cervical: No superficial cervical, no deep cervical and no posterior cervical adenopathy present. Left cervical: No superficial cervical, no deep cervical and no posterior cervical adenopathy present. She has no axillary adenopathy. Right axillary: No pectoral and no lateral adenopathy present.         Left axillary: No pectoral and no lateral adenopathy present. ASSESSMENT and PLAN    ICD-10-CM ICD-9-CM    1. Ductal carcinoma in situ (DCIS) of right breast D05.11 233.0         Pt presents for f/u s/p RIGHT breast lumpectomy on 04/03/18, reports RIGHT discomfort and swelling, including \"shooting\" pains or \"zingers. \" Assured her that these are normal and expected to resolve. Suggested she wear a supportive bra and apply ice PRN. We reviewed her DCISionRT results, which came back with low risk results of 9% total risk, and she may not need XRT. I will refer her to radiation and oncology for further consultation. Will also refer to medical oncology to discuss Tamoxifen. F/U mammo and appointment in 6 months. This plan was reviewed with the patient and patient agrees. All questions were answered.     Written by Randell Hernandez, as dictated by Dr. Rush Siddiqi MD.

## 2018-04-27 DIAGNOSIS — D05.11 DUCTAL CARCINOMA IN SITU (DCIS) OF RIGHT BREAST: Primary | ICD-10-CM

## 2018-05-03 ENCOUNTER — OFFICE VISIT (OUTPATIENT)
Dept: INTERNAL MEDICINE CLINIC | Age: 63
End: 2018-05-03

## 2018-05-03 VITALS
RESPIRATION RATE: 16 BRPM | HEIGHT: 69 IN | TEMPERATURE: 98 F | OXYGEN SATURATION: 96 % | BODY MASS INDEX: 33.77 KG/M2 | HEART RATE: 81 BPM | WEIGHT: 228 LBS | SYSTOLIC BLOOD PRESSURE: 122 MMHG | DIASTOLIC BLOOD PRESSURE: 73 MMHG

## 2018-05-03 DIAGNOSIS — E55.9 VITAMIN D DEFICIENCY: ICD-10-CM

## 2018-05-03 DIAGNOSIS — D05.11 DUCTAL CARCINOMA IN SITU (DCIS) OF RIGHT BREAST: ICD-10-CM

## 2018-05-03 DIAGNOSIS — E78.2 MIXED HYPERLIPIDEMIA: Primary | ICD-10-CM

## 2018-05-03 NOTE — MR AVS SNAPSHOT
[facility] 
 
 
 [address] 
[phone] Patient: Fide Lamb MRN: [id number] RMP:6/98/0306 Visit Information Date & Time Provider Department Dept. Phone Encounter #  
 5/3/2018  8:15 AM Dolores Ghotra MD [facility] [phone] [id number] Upcoming Health Maintenance Date Due Hepatitis C Screening 1955 Pneumococcal 19-64 Highest Risk (1 of 3 - PCV13) 5/22/1974 PAP AKA CERVICAL CYTOLOGY 5/22/1976 Influenza Age 5 to Adult 8/1/2018 BREAST CANCER SCRN MAMMOGRAM 1/24/2020 COLONOSCOPY 11/15/2020 DTaP/Tdap/Td series (2 - Td) 6/2/2021 Allergies as of 5/3/2018  Review Complete On: 5/3/2018 By: Dolores Ghotra MD  
 No Known Allergies Current Immunizations  Never Reviewed Name Date Influenza Vaccine 10/1/2015 TDAP Vaccine 6/2/2011 Zoster Vaccine, Live 2/3/2016 Not reviewed this visit You Were Diagnosed With   
  
 Codes Comments Mixed hyperlipidemia    -  Primary ICD-10-CM: J63.7 ICD-9-CM: 272.2 Ductal carcinoma in situ (DCIS) of right breast     ICD-10-CM: D05.11 ICD-9-CM: 233.0 Vitamin D deficiency     ICD-10-CM: E55.9 ICD-9-CM: 268.9 Vitals BP Pulse Temp Resp Height(growth percentile) Weight(growth percentile) 122/73 (BP 1 Location: Left arm, BP Patient Position: Sitting) 81 98 °F (36.7 °C) (Oral) 16 5' 9\" (1.753 m) 228 lb (103.4 kg) SpO2 BMI OB Status Smoking Status 96% 33.67 kg/m2 Postmenopausal Never Smoker Vitals History BMI and BSA Data Body Mass Index Body Surface Area  
 33.67 kg/m 2 2.24 m 2 Preferred Pharmacy Pharmacy Name Phone [place] [address] [facility] [place] [facility] [phone] Your Updated Medication List  
  
   
This list is accurate as of 5/3/18  9:07 AM.  Always use your most recent med list.  
  
  
  
  
 aspirin 81 mg tablet Take 81 mg by mouth Daily (before breakfast). esomeprazole 10 mg granules for oral suspension Commonly known as:  Delores Hu Take 10 mg by mouth daily. GLUCOSAMINE PO Take 1 Tab by mouth daily. loratadine 10 mg tablet Commonly known as:  Viviane Galea Take 10 mg by mouth daily as needed. MULTIVITAMIN PO Take 1 Tab by mouth Daily (before breakfast). rosuvastatin 10 mg tablet Commonly known as:  CRESTOR Take 1 Tab by mouth nightly. VITAMIN D3 1,000 unit tablet Generic drug:  cholecalciferol Take 1,000 Units by mouth Three (3) times a week. We Performed the Following CBC WITH AUTOMATED DIFF [01981 CPT(R)] HEPATIC FUNCTION PANEL [49199 CPT(R)] LIPID PANEL [26983 CPT(R)] REFERRAL TO ONCOLOGY [GHX61 Custom] VITAMIN D, 25 HYDROXY [id number] CPT(R)] To-Do List   
 05/10/2018 1:30 PM  
  Appointment with RAD ONC THERAPY [facility] at [facility] ([phone]) Referral Information Referral ID Referred By Referred To  
  
 [id number] Sol CONTRERAS MD   
   [address] [place] [address] [phone] [place], [address] [facility] Phone: [phone] Fax: [phone] Visits Status Start Date End Date 1 New Request 5/3/18 5/3/19 If your referral has a status of pending review or denied, additional information will be sent to support the outcome of this decision. Patient Instructions   
[email or web address] hospitals & HEALTH [facility]! Dear Adelaida Burroughs: Thank you for requesting a [employer] account. Our records indicate that you already have an active [employer] account. You can access your account anytime at [email or web address] Did you know that you can access your hospital and ER discharge instructions at any time in [employer]? You can also review all of your test results from your hospital stay or ER visit. Additional Information If you have questions, please visit the Frequently Asked Questions section of the [employer] website at [email or web address] Remember, [employer] is NOT to be used for urgent needs. For medical emergencies, dial 911. Now available from your iPhone and Android! Please provide this summary of care documentation to your next provider. Your primary care clinician is listed as Yessi Hernandez. If you have any questions after today's visit, please call [phone].

## 2018-05-03 NOTE — PROGRESS NOTES
Debi Zazueta is a 58 y.o. female who presents today for Medication Evaluation and Cholesterol Problem  . She has a history of   Patient Active Problem List   Diagnosis Code    Vitamin D deficiency E55.9    HLD (hyperlipidemia) E78.5    Family history of bicuspid aortic valve Z82.79    Gastroesophageal reflux disease without esophagitis K21.9    DCIS (ductal carcinoma in situ) D05.10   . Today patient is here for f/u. Breast Cancer: DCIS, s/p lumpectomy. Will be seeing Rad/Onc to discuss role of XRT. Assay with low risk features. Pain better. Would like to see oncologist.   Very rare pain to the surgical site. Interested if diet played a role. Suggest going to [facility] and trying low meat and processed diet and sticking to plant based diet. Hyperlipidemia   Currently she takes 10 mg of Crestor. ROS: taking medications as instructed, no medication side effects noted  No new myalgias, no joint pains, no weakness  No TIA's, no chest pain on exertion, no dyspnea on exertion, no swelling of ankles. Lab Results   Component Value Date/Time    Cholesterol, total 202 (H) 03/14/2017 07:51 AM    HDL Cholesterol 50 03/14/2017 07:51 AM    LDL,Direct 206 (A) 05/02/2015    LDL, calculated 113 (H) 03/14/2017 07:51 AM    VLDL, calculated 39 03/14/2017 07:51 AM    Triglyceride 193 (H) 03/14/2017 07:51 AM       ROS  Review of Systems   Constitutional: Negative for chills, fever and weight loss. HENT: Negative for ear pain, hearing loss and tinnitus. Respiratory: Negative for cough and shortness of breath. Cardiovascular: Negative for chest pain, palpitations and leg swelling. Gastrointestinal: Negative for abdominal pain, nausea and vomiting. Skin: Negative for itching and rash. Neurological: Negative. Endo/Heme/Allergies: Does not bruise/bleed easily. Psychiatric/Behavioral: Negative for depression. The patient is not nervous/anxious.         Visit Vitals    /73 (BP 1 Location: Left arm, BP Patient Position: Sitting)    Pulse 81    Temp 98 °F (36.7 °C) (Oral)    Resp 16    Ht 5' 9\" (1.753 m)    Wt 228 lb (103.4 kg)    SpO2 96%    BMI 33.67 kg/m2       Physical Exam   Constitutional: She is oriented to person, place, and time. She appears well-developed and well-nourished. HENT:   Head: Normocephalic and atraumatic. Cardiovascular: Normal rate and regular rhythm. No murmur heard. Pulmonary/Chest: Effort normal. No respiratory distress. Neurological: She is alert and oriented to person, place, and time. Skin: Skin is warm and dry. Psychiatric: She has a normal mood and affect. Her behavior is normal.         Current Outpatient Prescriptions   Medication Sig    esomeprazole (NEXIUM) 10 mg granules for oral suspension Take 10 mg by mouth daily.  rosuvastatin (CRESTOR) 10 mg tablet Take 1 Tab by mouth nightly.  loratadine (CLARITIN) 10 mg tablet Take 10 mg by mouth daily as needed.  cholecalciferol, vitamin d3, (VITAMIN D) 1,000 unit tablet Take 1,000 Units by mouth Three (3) times a week.  aspirin 81 mg tablet Take 81 mg by mouth Daily (before breakfast).  GLUCOSAMINE SULFATE (GLUCOSAMINE PO) Take 1 Tab by mouth daily.  MULTIVITAMIN PO Take 1 Tab by mouth Daily (before breakfast). No current facility-administered medications for this visit.          Past Medical History:   Diagnosis Date    Adverse effect of anesthesia     Pt stated \"it takes alot of medication to put her under because she is a redhead\"    AR (allergic rhinitis)     Cancer ([facility] 02/05/2018    DCIS Right Breast      GERD (gastroesophageal reflux disease)     HLD (hyperlipidemia)     Post-menopausal AGE 48    Schatzki's ring     Vitamin D deficiency       Past Surgical History:   Procedure Laterality Date    COLONOSCOPY  11/15/10    small EH, repeat 10 years; dr Emerson Da Silva EGD  11/15/10    stricture ge junction (dilated), small hh     HX BREAST LUMPECTOMY Right 4/3/2018    RIGHT BREAST LUMPECTOMY WITH ULTRASOUND  performed by Sarahi Vasquez MD at [address] HX COLONOSCOPY        Social History   Substance Use Topics    Smoking status: Never Smoker    Smokeless tobacco: Never Used    Alcohol use 3.5 oz/week     7 Glasses of wine per week      Family History   Problem Relation Age of Onset    Diabetes Mother     Elevated Lipids Mother     Hypertension Mother    Maggie Flor Elevated Lipids Sister     Hypertension Brother     Hypertension Maternal Grandfather     Heart Disease Maternal Grandfather     Cancer Paternal Grandmother      stomach    Alzheimer Father     Alzheimer Maternal Grandmother         No Known Allergies     Assessment/Plan  Diagnoses and all orders for this visit:    1. Mixed hyperlipidemia - repeat. -     HEPATIC FUNCTION PANEL  -     VITAMIN D, 25 HYDROXY  -     LIPID PANEL  -     CBC WITH AUTOMATED DIFF    2. Ductal carcinoma in situ (DCIS) of right breast - S/p lumpectomy. Low risk per assay. Refer to be seen by Onc to discuss if there is any indication for further XRT or Chemo. Mentally doing well. [facility] Oncology ref [facility]    3. Vitamin D deficiency  -     HEPATIC FUNCTION PANEL  -     VITAMIN D, 25 HYDROXY  -     LIPID PANEL    Over 50% of a visit of 25 minutes spent reviewing diagnosis and treatment options and side effects of medicines.       Follow-up Disposition: Not on File    Sung De Leon MD  5/3/2018

## 2018-05-07 ENCOUNTER — TELEPHONE (OUTPATIENT)
Dept: ONCOLOGY | Age: 63
End: 2018-05-07

## 2018-06-05 ENCOUNTER — OFFICE VISIT (OUTPATIENT)
Dept: ONCOLOGY | Age: 63
End: 2018-06-05

## 2018-06-05 VITALS
BODY MASS INDEX: 34.21 KG/M2 | SYSTOLIC BLOOD PRESSURE: 137 MMHG | HEART RATE: 82 BPM | RESPIRATION RATE: 18 BRPM | WEIGHT: 231 LBS | HEIGHT: 69 IN | TEMPERATURE: 98.1 F | DIASTOLIC BLOOD PRESSURE: 76 MMHG | OXYGEN SATURATION: 97 %

## 2018-06-05 DIAGNOSIS — D05.11 DUCTAL CARCINOMA IN SITU (DCIS) OF RIGHT BREAST: Primary | ICD-10-CM

## 2018-06-05 RX ORDER — TAMOXIFEN CITRATE 20 MG/1
20 TABLET ORAL DAILY
Qty: 90 TAB | Refills: 3 | Status: SHIPPED | OUTPATIENT
Start: 2018-06-05

## 2018-06-05 NOTE — PATIENT INSTRUCTIONS
Dr. Ivory Flores    Both at [address] (By mouth)   Tamoxifen ([id number]-fen)  Treats breast cancer. May prevent breast cancer in women who have a high risk. Brand Name(s): Nolvadex, Soltamox   There may be other brand names for this medicine. When This Medicine Should Not Be Used: You should not use this medicine if you have had an allergic reaction to tamoxifen, or if you are pregnant. You should not use this medicine if you are also using blood thinners (such as warfarin, Coumadin®), or if you have had a blood clot or blood clotting problems. How to Use This Medicine:   Liquid, Tablet  · Medicines used to treat cancer are very strong and can have many side effects. Before receiving this medicine, make sure you understand all the risks and benefits. It is important for you to work closely with your doctor during your treatment. · This medicine should come with a Medication Guide. Ask your pharmacist for a copy if you do not have one. · Your doctor will tell you how much medicine to use. Do not use more than directed. You may need to take this medicine for 5 years or longer. · Swallow the tablet whole. You may take this medicine with or without food. · Measure the oral liquid medicine with a marked measuring spoon, oral syringe, or medicine cup. · Take this medicine at the same time each day. If a dose is missed:   · Take a dose as soon as you remember. If it is almost time for your next dose, wait until then and take a regular dose. Do not take extra medicine to make up for a missed dose. How to Store and Dispose of This Medicine:   · Store the medicine in a closed container at room temperature, away from heat, moisture, and direct light. Do not store in the refrigerator or freezer. · Ask your pharmacist, doctor, or health caregiver about the best way to dispose of any leftover medicine after you have finished your treatment.  You will also need to throw away old medicine after the expiration date has passed. · Keep all medicine out of the reach of children. Never share your medicine with anyone. Drugs and Foods to Avoid:   Ask your doctor or pharmacist before using any other medicine, including over-the-counter medicines, vitamins, and herbal products. · Make sure your doctor knows if you are also using aminoglutethimide (Cytadren®), bromocriptine (Parlodel®), letrozole (Femara®), rifampin (Rifadin®, Rimactane®), or other cancer treatments. · Birth control pills, implants, or shots may not work while you are using tamoxifen. To keep from getting pregnant, use another form of birth control. Other forms include condoms, a diaphragm, or contraceptive foam or jelly. Warnings While Using This Medicine:   · It is not safe to take this medicine during pregnancy. It could harm an unborn baby. Tell your doctor right away if you become pregnant. Keep using effective birth control for at least 2 months after you stop treatment. · To make sure you are not pregnant, you may start taking this medicine while you are having your menstrual period. Also, you must have a negative pregnancy test before you will be allowed to take this medicine. · Make sure your doctor knows if you have liver disease, cataracts, eye or vision problems, hypercalcemia (high calcium in the blood), or high cholesterol or triglycerides (fat) in the blood. · Do not breastfeed while you are using this medicine. · Your doctor will check your progress and the effects of this medicine at regular visits. Keep all appointments. It is important for women to have regular gynecologic check-ups while taking tamoxifen. · Make sure any doctor or dentist who treats you knows that you are using this medicine. · This medicine may increase your risk of developing other rare but serious conditions, such as stroke, a blood clot in the lungs or veins, or cancer of the uterus.  Talk with your doctor about these risks and your personal situation. · This medicine may cause changes in your menstrual periods, which could be a sign of a serious problem. Tell your doctor about any unusual vaginal bleeding or discharge. · Some of the side effects of this medicine may not appear for months or years, or after you have stopped using this medicine. Tell your doctor if you have later side effects. · Liver problems may occur while you are using this medicine. Stop using this medicine and check with your doctor right away if you are having more than one of these symptoms: abdominal pain or tenderness; martha-colored stools; dark urine; decreased appetite; fever; headache; itching; loss of appetite; nausea and vomiting; skin rash; swelling of the feet or lower legs; unusual tiredness or weakness; or yellow eyes or skin. Possible Side Effects While Using This Medicine:   Call your doctor right away if you notice any of these side effects:  · Allergic reaction: Itching or hives, swelling in your face or hands, swelling or tingling in your mouth or throat, chest tightness, trouble breathing  · Chest pain, shortness of breath, or coughing up blood. · Dark-colored urine or pale stools. · Fever, chills, cough, sore throat, and body aches. · Heavy or abnormal vaginal bleeding, pelvic pain or pressure. · Nausea, vomiting, loss of appetite, or pain in your upper stomach. · New breast lumps. · Numbness or weakness in your arm or leg, or on one side of your body. · Pain in your lower leg (calf). · Sudden or severe headache, or problems with vision, speech, or walking. · Swelling in your hands, ankles, or feet. · Unusual bleeding, bruising, or weakness. · Yellowing of your skin or the whites of your eyes. If you notice these less serious side effects, talk with your doctor:   · Back or joint pain. · Blurred vision, change in color vision. · Constipation, diarrhea, or stomach pain or upset. · Headache. · Hot flashes, vaginal discharge.   · Increased tumor pain or bone pain. · Loss of interest in sex or trouble having sex (in men). · Rash. · Trouble with sleeping. If you notice other side effects that you think are caused by this medicine, tell your doctor. Call your doctor for medical advice about side effects. You may report side effects to FDA at 2-846-FDA-5419  © 2017 [address] Information is for End User's use only and may not be sold, redistributed or otherwise used for commercial purposes. The above information is an  only. It is not intended as medical advice for individual conditions or treatments. Talk to your doctor, nurse or pharmacist before following any medical regimen to see if it is safe and effective for you. Anastrozole (By mouth)   Anastrozole (an-AS-troe-zole)  Treats breast cancer. Brand Name(s): Arimidex   There may be other brand names for this medicine. When This Medicine Should Not Be Used: This medicine is not right for everyone. Do not use it if you had an allergic reaction to anastrozole, if you are pregnant, or if you have not stopped menstruating (premenopausal). How to Use This Medicine:   Tablet  · Medicines used to treat cancer are very strong and can have many side effects. Before receiving this medicine, make sure you understand all the risks and benefits. It is important for you to work closely with your doctor during your treatment. · Your doctor will tell you how much medicine to use. Do not use more than directed. · Read and follow the patient instructions that come with this medicine. Talk to your doctor or pharmacist if you have any questions. · Missed dose: Take a dose as soon as you remember. If it is almost time for your next dose, wait until then and take a regular dose. Do not take extra medicine to make up for a missed dose. · If you vomit after taking your medicine, call your doctor or pharmacist for instructions.   · Store the medicine in a closed container at room temperature, away from heat, moisture, and direct light. · Ask your pharmacist, doctor, or health caregiver about the best way to dispose of any leftover medicine after you have finished your treatment. You will also need to throw away old medicine after the expiration date has passed. Drugs and Foods to Avoid:   Ask your doctor or pharmacist before using any other medicine, including over-the-counter medicines, vitamins, and herbal products. · Some medicines can affect how anastrozole works. Tell your doctor if you are taking any of the following:   ¨ Medicine that contains estrogen  ¨ Tamoxifen  Warnings While Using This Medicine:   · Pregnancy after menopause is not likely, but if you think you could be pregnant, tell your doctor. This medicine could harm an unborn baby. · Tell your doctor if you are breastfeeding, or if you have liver disease, bone problems (such as osteoporosis), high cholesterol, or heart disease. · This medicine may cause the following problems:   ¨ Increased risk for weak bones or osteoporosis  ¨ Increased cholesterol levels  ¨ Increased risk for heart attack or stroke  · Your doctor will do lab tests at regular visits to check on the effects of this medicine. Keep all appointments. · Cancer medicine can cause nausea or vomiting, sometimes even after you receive medicine to prevent these effects. Ask your doctor or nurse about other ways to control any nausea or vomiting that might happen. · Keep all medicine out of the reach of children. Never share your medicine with anyone.   Possible Side Effects While Using This Medicine:   Call your doctor right away if you notice any of these side effects:  · Allergic reaction: Itching or hives, swelling in your face or hands, swelling or tingling in your mouth or throat, chest tightness, trouble breathing  · Back, bone, joint, or muscle pain  · Blistering, peeling, or red skin rash  · Chest pain or shortness of breath  · Fever, chills, cough, sore throat, and body aches  · Numbness, tingling, or burning pain in your hands, arms, legs, or feet  · Rapid weight gain, or swelling in your hands, ankles, or feet  · Severe diarrhea, nausea, or vomiting  · Vaginal bleeding or discharge  · Yellowing of your skin or the whites of your eyes  If you notice these less serious side effects, talk with your doctor:   · Breast pain  · Dizziness, headache, tiredness, or weakness  · Mood changes, anxiety, or irritability  · Trouble sleeping  · Warmth or redness in your face, neck, arms, or upper chest  If you notice other side effects that you think are caused by this medicine, tell your doctor. Call your doctor for medical advice about side effects. You may report side effects to FDA at 3-004-FDA-2048  © 2017 [facility] Information is for End User's use only and may not be sold, redistributed or otherwise used for commercial purposes. The above information is an  only. It is not intended as medical advice for individual conditions or treatments. Talk to your doctor, nurse or pharmacist before following any medical regimen to see if it is safe and effective for you.

## 2018-06-05 NOTE — MR AVS SNAPSHOT
[facility] 
 
 
 [address] [facility], [facility] [address] 
[phone] Patient: Remi Jones MRN: [id number] Visit Information Date & Time Provider Department Dept. Phone Encounter #  
 6/5/2018  1:00 PM [facility]ado [facility] [address] [place] [address] [id number] Upcoming Health Maintenance Date Due Hepatitis C Screening 1955 Pneumococcal 19-64 Highest Risk (1 of 3 - PCV13) 5/22/1974 PAP AKA CERVICAL CYTOLOGY 5/22/1976 Influenza Age 5 to Adult 8/1/2018 BREAST CANCER SCRN MAMMOGRAM 1/24/2020 COLONOSCOPY 11/15/2020 DTaP/Tdap/Td series (2 - Td) 6/2/2021 Allergies as of 6/5/2018  Review Complete On: 6/5/2018 By: Tanner Alvarado MD  
 No Known Allergies Current Immunizations  Never Reviewed Name Date Influenza Vaccine 10/1/2015 TDAP Vaccine 6/2/2011 Zoster Vaccine, Live 2/3/2016 Not reviewed this visit You Were Diagnosed With   
  
 Codes Comments Ductal carcinoma in situ (DCIS) of right breast    -  Primary ICD-10-CM: D05.11 ICD-9-CM: 233.0 Vitals BP Pulse Temp Resp Height(growth percentile) Weight(growth percentile)  
 137/76 82 98.1 °F (36.7 °C) (Oral) 18 5' 9\" (1.753 m) 231 lb (104.8 kg) SpO2 BMI OB Status Smoking Status 97% 34.11 kg/m2 Postmenopausal Never Smoker Vitals History BMI and BSA Data Body Mass Index Body Surface Area  
 34.11 kg/m 2 2.26 m 2 Preferred Pharmacy Pharmacy Name Phone [facility] [place] [phone] Your Updated Medication List  
  
   
This list is accurate as of 6/5/18  2:11 PM.  Always use your most recent med list.  
  
  
  
  
 aspirin 81 mg tablet Take 81 mg by mouth Daily (before breakfast). esomeprazole 10 mg granules for oral suspension Commonly known as:  Tien Crumble Take 10 mg by mouth daily. GLUCOSAMINE PO Take 1 Tab by mouth daily. loratadine 10 mg tablet Commonly known as:  Gladis Buddle Take 10 mg by mouth daily as needed. MULTIVITAMIN PO Take 1 Tab by mouth Daily (before breakfast). rosuvastatin 10 mg tablet Commonly known as:  CRESTOR Take 1 Tab by mouth nightly. tamoxifen 20 mg tablet Commonly known as:  NOLVADEX Take 1 Tab by mouth daily. VITAMIN D3 1,000 unit tablet Generic drug:  cholecalciferol Take 1,000 Units by mouth Three (3) times a week. Prescriptions Sent to Pharmacy Refills  
 tamoxifen (NOLVADEX) 20 mg tablet 3 Sig: Take 1 Tab by mouth daily. Class: Normal  
 Pharmacy: [address] [place] [address] #: [phone] Route: Oral  
  
Patient Instructions Dr. Veronica Ozuna Both at [address] (By mouth) Tamoxifen (kt-VZI-h-fen) Treats breast cancer. May prevent breast cancer in women who have a high risk. Brand Name(s): Nolvadex, Soltamox There may be other brand names for this medicine. When This Medicine Should Not Be Used: You should not use this medicine if you have had an allergic reaction to tamoxifen, or if you are pregnant. You should not use this medicine if you are also using blood thinners (such as warfarin, Coumadin®), or if you have had a blood clot or blood clotting problems. How to Use This Medicine:  
Liquid, Tablet · Medicines used to treat cancer are very strong and can have many side effects. Before receiving this medicine, make sure you understand all the risks and benefits. It is important for you to work closely with your doctor during your treatment. · This medicine should come with a Medication Guide. Ask your pharmacist for a copy if you do not have one. · Your doctor will tell you how much medicine to use. Do not use more than directed. You may need to take this medicine for 5 years or longer. · Swallow the tablet whole. You may take this medicine with or without food. · Measure the oral liquid medicine with a marked measuring spoon, oral syringe, or medicine cup. · Take this medicine at the same time each day. If a dose is missed: · Take a dose as soon as you remember. If it is almost time for your next dose, wait until then and take a regular dose. Do not take extra medicine to make up for a missed dose. How to Store and Dispose of This Medicine: · Store the medicine in a closed container at room temperature, away from heat, moisture, and direct light. Do not store in the refrigerator or freezer. · Ask your pharmacist, doctor, or health caregiver about the best way to dispose of any leftover medicine after you have finished your treatment. You will also need to throw away old medicine after the expiration date has passed. · Keep all medicine out of the reach of children. Never share your medicine with anyone. Drugs and Foods to Avoid: Ask your doctor or pharmacist before using any other medicine, including over-the-counter medicines, vitamins, and herbal products. · Make sure your doctor knows if you are also using aminoglutethimide (Cytadren®), bromocriptine (Parlodel®), letrozole (Femara®), rifampin (Rifadin®, Rimactane®), or other cancer treatments. · Birth control pills, implants, or shots may not work while you are using tamoxifen. To keep from getting pregnant, use another form of birth control. Other forms include condoms, a diaphragm, or contraceptive foam or jelly. Warnings While Using This Medicine: · It is not safe to take this medicine during pregnancy. It could harm an unborn baby. Tell your doctor right away if you become pregnant. Keep using effective birth control for at least 2 months after you stop treatment. · To make sure you are not pregnant, you may start taking this medicine while you are having your menstrual period.  Also, you must have a negative pregnancy test before you will be allowed to take this medicine. · Make sure your doctor knows if you have liver disease, cataracts, eye or vision problems, hypercalcemia (high calcium in the blood), or high cholesterol or triglycerides (fat) in the blood. · Do not breastfeed while you are using this medicine. · Your doctor will check your progress and the effects of this medicine at regular visits. Keep all appointments. It is important for women to have regular gynecologic check-ups while taking tamoxifen. · Make sure any doctor or dentist who treats you knows that you are using this medicine. · This medicine may increase your risk of developing other rare but serious conditions, such as stroke, a blood clot in the lungs or veins, or cancer of the uterus. Talk with your doctor about these risks and your personal situation. · This medicine may cause changes in your menstrual periods, which could be a sign of a serious problem. Tell your doctor about any unusual vaginal bleeding or discharge. · Some of the side effects of this medicine may not appear for months or years, or after you have stopped using this medicine. Tell your doctor if you have later side effects. · Liver problems may occur while you are using this medicine. Stop using this medicine and check with your doctor right away if you are having more than one of these symptoms: abdominal pain or tenderness; martha-colored stools; dark urine; decreased appetite; fever; headache; itching; loss of appetite; nausea and vomiting; skin rash; swelling of the feet or lower legs; unusual tiredness or weakness; or yellow eyes or skin. Possible Side Effects While Using This Medicine:  
Call your doctor right away if you notice any of these side effects: · Allergic reaction: Itching or hives, swelling in your face or hands, swelling or tingling in your mouth or throat, chest tightness, trouble breathing · Chest pain, shortness of breath, or coughing up blood. · Dark-colored urine or pale stools. · Fever, chills, cough, sore throat, and body aches. · Heavy or abnormal vaginal bleeding, pelvic pain or pressure. · Nausea, vomiting, loss of appetite, or pain in your upper stomach. · New breast lumps. · Numbness or weakness in your arm or leg, or on one side of your body. · Pain in your lower leg (calf). · Sudden or severe headache, or problems with vision, speech, or walking. · Swelling in your hands, ankles, or feet. · Unusual bleeding, bruising, or weakness. · Yellowing of your skin or the whites of your eyes. If you notice these less serious side effects, talk with your doctor: · Back or joint pain. · Blurred vision, change in color vision. · Constipation, diarrhea, or stomach pain or upset. · Headache. · Hot flashes, vaginal discharge. · Increased tumor pain or bone pain. · Loss of interest in sex or trouble having sex (in men). · Rash. · Trouble with sleeping. If you notice other side effects that you think are caused by this medicine, tell your doctor. Call your doctor for medical advice about side effects. You may report side effects to FDA at 6-608-FDA-7356 © 2017 [address] Information is for End User's use only and may not be sold, redistributed or otherwise used for commercial purposes. The above information is an  only. It is not intended as medical advice for individual conditions or treatments. Talk to your doctor, nurse or pharmacist before following any medical regimen to see if it is safe and effective for you. Anastrozole (By mouth) Anastrozole (an-AS-troe-zole) Treats breast cancer. Brand Name(s): Arimidex There may be other brand names for this medicine. When This Medicine Should Not Be Used: This medicine is not right for everyone.  Do not use it if you had an allergic reaction to anastrozole, if you are pregnant, or if you have not stopped menstruating (premenopausal). How to Use This Medicine:  
Tablet · Medicines used to treat cancer are very strong and can have many side effects. Before receiving this medicine, make sure you understand all the risks and benefits. It is important for you to work closely with your doctor during your treatment. · Your doctor will tell you how much medicine to use. Do not use more than directed. · Read and follow the patient instructions that come with this medicine. Talk to your doctor or pharmacist if you have any questions. · Missed dose: Take a dose as soon as you remember. If it is almost time for your next dose, wait until then and take a regular dose. Do not take extra medicine to make up for a missed dose. · If you vomit after taking your medicine, call your doctor or pharmacist for instructions. · Store the medicine in a closed container at room temperature, away from heat, moisture, and direct light. · Ask your pharmacist, doctor, or health caregiver about the best way to dispose of any leftover medicine after you have finished your treatment. You will also need to throw away old medicine after the expiration date has passed. Drugs and Foods to Avoid: Ask your doctor or pharmacist before using any other medicine, including over-the-counter medicines, vitamins, and herbal products. · Some medicines can affect how anastrozole works. Tell your doctor if you are taking any of the following: ¨ Medicine that contains estrogen ¨ Tamoxifen Warnings While Using This Medicine: · Pregnancy after menopause is not likely, but if you think you could be pregnant, tell your doctor. This medicine could harm an unborn baby. · Tell your doctor if you are breastfeeding, or if you have liver disease, bone problems (such as osteoporosis), high cholesterol, or heart disease. · This medicine may cause the following problems: ¨ Increased risk for weak bones or osteoporosis ¨ Increased cholesterol levels ¨ Increased risk for heart attack or stroke · Your doctor will do lab tests at regular visits to check on the effects of this medicine. Keep all appointments. · Cancer medicine can cause nausea or vomiting, sometimes even after you receive medicine to prevent these effects. Ask your doctor or nurse about other ways to control any nausea or vomiting that might happen. · Keep all medicine out of the reach of children. Never share your medicine with anyone. Possible Side Effects While Using This Medicine:  
Call your doctor right away if you notice any of these side effects: · Allergic reaction: Itching or hives, swelling in your face or hands, swelling or tingling in your mouth or throat, chest tightness, trouble breathing · Back, bone, joint, or muscle pain · Blistering, peeling, or red skin rash · Chest pain or shortness of breath · Fever, chills, cough, sore throat, and body aches · Numbness, tingling, or burning pain in your hands, arms, legs, or feet · Rapid weight gain, or swelling in your hands, ankles, or feet · Severe diarrhea, nausea, or vomiting · Vaginal bleeding or discharge · Yellowing of your skin or the whites of your eyes If you notice these less serious side effects, talk with your doctor: · Breast pain · Dizziness, headache, tiredness, or weakness · Mood changes, anxiety, or irritability · Trouble sleeping · Warmth or redness in your face, neck, arms, or upper chest 
If you notice other side effects that you think are caused by this medicine, tell your doctor. Call your doctor for medical advice about side effects. You may report side effects to FDA at 8-565-FDA-7553 © 2017 [address] Information is for End User's use only and may not be sold, redistributed or otherwise used for commercial purposes. The above information is an  only.  It is not intended as medical advice for individual conditions or treatments. Talk to your doctor, nurse or pharmacist before following any medical regimen to see if it is safe and effective for you. Introducing \[facility] [place] [facility]\"" & HEALTH SERVICES! Dear Macho Covarrubias: Thank you for requesting a [employer] account. Our records indicate that you already have an active [employer] account. You can access your account anytime at [email or web address] Did you know that you can access your hospital and ER discharge instructions at any time in [employer]? You can also review all of your test results from your hospital stay or ER visit. Additional Information If you have questions, please visit the Frequently Asked Questions section of the [employer] website at [email or web address] Remember, [employer] is NOT to be used for urgent needs. For medical emergencies, dial 911. Now available from your iPhone and Android! Please provide this summary of care documentation to your next provider. Your primary care clinician is listed as Yessi Hernandez. If you have any questions after today's visit, please call [phone].

## 2018-06-05 NOTE — PROGRESS NOTES
[facility] [place] at [address], [address] [facility] [address] [facility] [address] [facility]  Cheryl Moots: [phone]  F: [phone]      Reason for Visit:   Madelyne Hashimoto is a 61 y.o. female who is seen in consultation at the request of Dr. Penny Anglin for evaluation of therapy for DCIS. Treatment History:   · 2/5/18 right breast core bx:  DCIS, gr 2, solid and cribriform, ER + at 90%  · 4/3/18 right breast excision:  4 mm of DCIS, gr 3, cribriform and solid type, pTis cN0 cM0, stage 0  · DCISionRT  Showed low risk    History of Present Illness: An abnormal mammogram led to the pathology above. She has no complaints today.     FH:  No breast, ovarian, prostate, pancreatic cancer    Past Medical History:   Diagnosis Date    Adverse effect of anesthesia     Pt stated \"it takes alot of medication to put her under because she is a redhead\"    AR (allergic rhinitis)     Cancer ([facility] [address] 02/05/2018    DCIS Right Breast      GERD (gastroesophageal reflux disease)     HLD (hyperlipidemia)     Post-menopausal AGE 48    Schatzki's ring     Vitamin D deficiency       Past Surgical History:   Procedure Laterality Date    COLONOSCOPY  11/15/10    small EH, repeat 10 years; dr Aristides Purcell EGD  11/15/10    stricture ge junction (dilated), small hh     HX BREAST LUMPECTOMY Right 4/3/2018    RIGHT BREAST LUMPECTOMY WITH ULTRASOUND  performed by Karthik Amaya MD at [address] HX COLONOSCOPY        Social History   Substance Use Topics    Smoking status: Never Smoker    Smokeless tobacco: Never Used    Alcohol use 6.0 oz/week     10 Glasses of wine per week      Family History   Problem Relation Age of Onset    Diabetes Mother     Elevated Lipids Mother     Hypertension Mother    Molly Moses Elevated Lipids Sister     Hypertension Brother     Hypertension Maternal Grandfather     Heart Disease Maternal Grandfather     Cancer Paternal Grandmother      stomach     Alzheimer Father     Alzheimer Maternal Grandmother      Current Outpatient Prescriptions   Medication Sig    esomeprazole (NEXIUM) 10 mg granules for oral suspension Take 10 mg by mouth daily.  rosuvastatin (CRESTOR) 10 mg tablet Take 1 Tab by mouth nightly.  cholecalciferol, vitamin d3, (VITAMIN D) 1,000 unit tablet Take 1,000 Units by mouth Three (3) times a week.  aspirin 81 mg tablet Take 81 mg by mouth Daily (before breakfast).  GLUCOSAMINE SULFATE (GLUCOSAMINE PO) Take 1 Tab by mouth daily.  MULTIVITAMIN PO Take 1 Tab by mouth Daily (before breakfast).  loratadine (CLARITIN) 10 mg tablet Take 10 mg by mouth daily as needed. No current facility-administered medications for this visit. No Known Allergies     Review of Systems: A complete review of systems was obtained, negative except as described above. Physical Exam:     Visit Vitals    /76    Pulse 82    Temp 98.1 °F (36.7 °C) (Oral)    Resp 18    Ht 5' 9\" (1.753 m)    Wt 231 lb (104.8 kg)    SpO2 97%    BMI 34.11 kg/m2     ECOG PS: 0  General: No distress  Eyes: PERRLA, anicteric sclerae  HENT: Atraumatic, OP clear  Neck: Supple  Lymphatic: No cervical, supraclavicular, or inguinal adenopathy  Respiratory: CTAB, normal respiratory effort  CV: Normal rate, regular rhythm, no murmurs, no peripheral edema  GI: Soft, nontender, nondistended, no masses, no hepatomegaly, no splenomegaly  MS: Normal gait and station. Digits without clubbing or cyanosis. Skin: No rashes, ecchymoses, or petechiae. Normal temperature, turgor, and texture. Psych: Alert, oriented, appropriate affect, normal judgment/insight    Results:     Lab Results   Component Value Date/Time    WBC 7.5 03/14/2017 07:51 AM    HGB 13.2 03/14/2017 07:51 AM    HCT 38.9 03/14/2017 07:51 AM    PLATELET 168 64/43/0036 07:51 AM    MCV 84 03/14/2017 07:51 AM    ABS.  NEUTROPHILS 4.2 03/14/2017 07:51 AM    Hemoglobin (POC) 13.6 08/07/2010 12:11 PM    Hematocrit (POC) 40 08/07/2010 12:11 PM Lab Results   Component Value Date/Time    Sodium 142 03/26/2018 09:06 AM    Potassium 4.2 03/26/2018 09:06 AM    Chloride 105 03/26/2018 09:06 AM    CO2 30 03/26/2018 09:06 AM    Glucose 98 03/26/2018 09:06 AM    BUN 9 03/26/2018 09:06 AM    Creatinine 0.82 03/26/2018 09:06 AM    GFR est AA >60 03/26/2018 09:06 AM    GFR est non-AA >60 03/26/2018 09:06 AM    Calcium 9.2 03/26/2018 09:06 AM    Sodium (POC) 141 08/07/2010 12:11 PM    Potassium (POC) 3.8 08/07/2010 12:11 PM    Chloride (POC) 108 (H) 08/07/2010 12:11 PM    Glucose (POC) 95 08/07/2010 12:11 PM    BUN (POC) 12 08/07/2010 12:11 PM    Creatinine (POC) 0.8 02/26/2018 09:33 AM    Calcium, ionized (POC) 1.23 08/07/2010 12:11 PM     Lab Results   Component Value Date/Time    Bilirubin, total 0.4 03/14/2017 07:51 AM    ALT (SGPT) 21 03/14/2017 07:51 AM    AST (SGOT) 22 03/14/2017 07:51 AM    Alk. phosphatase 73 03/14/2017 07:51 AM    Protein, total 6.9 03/14/2017 07:51 AM    Albumin 4.5 03/14/2017 07:51 AM         Records reviewed and summarized above. Assessment/plan:   1. Right DCIS:  Stage 0    The major issue for our consultation today was the use of tamoxifen in patients with DCIS that expresses estrogen and/or progesterone receptors. The following was discussed. The [employer] (nccn.org) guidelines suggest consideration of tamoxifen for women with DCIS treated with lumpectomy and radiation and who have hormone-receptor-positive tumors. Randomized trials in patients with DCIS suggest that tamoxifen would further reduce the risk of in-breast recurrence by about 30-40% in this patient, and decrease this patient's risk of contralateral breast cancer by about half. At present and from the literature, the risk of in-breast recurrence after radiation would probably be about 5-10 % at 10 years. Tamoxifen would decrease this risk by 30-40%.  Her risk of contralateral breast cancer is 0.5-1% a year, and tamoxifen would also decrease that risk by about half. About half of recurrences are invasive and half DCIS. However, from the data available, tamoxifen has not been associated with an improvement in survival. Aromatase inhibitors have been studied in this setting, but none of the trials are currently published, and AIs are not approved for use in this situation. She declined XRT. The risks and benefits of tamoxifen were discussed in detail and the patient was informed of the following: Risks include a 1% risk of endometrial cancer for postmenopausal women treated for five years but no (or a minimally increased) risk in premenopausal women and that most women who develop tamoxifen-associated endometrial cancer can be cured. Any bleeding in a postmenopausal woman should be reported to a health care professional. There is also a 1% risk of blood clots (thromboembolism) that can be fatal. All patients irrespective of age who take tamoxifen and who have not had a hysterectomy should have a pelvic exam and Pap smear yearly. Tamoxifen increases the risk of cataract formation and on rare occasions has caused retinal damage: an eye exam is recommended yearly. Other risks include vaginal discharge or dryness, the development or worsening of hot flashes or vasomotor symptoms, and bone loss in premenopausal women. There is excellent evidence that tamoxifen does not increase risk of depression, cause weight gain or have a major effect on sexual function. Available data suggests little or no effect on cognitive function. Benefits include a lowering of cholesterol and a reduction in the rate of bone loss for postmenopausal woman. Any other symptoms should be reported. The risks and benefits of aromatase inhibitors (anastrozole, letrozole, and exemestane) were discussed in detail and the patient was informed of the following: Risks include the development of painful muscles and joints (arthralgia/myalgia) and bone loss.  Muscle and joint pain can be severe but rarely result in any tissue damage; symptoms usually resolve in several weeks when the medication is stopped. Bone loss is common and a bone density test is recommended as a baseline and then yearly to every several years depending on initial results. The risk of fractures is increased by a few percent in patients taking these drugs, but careful monitoring of bone density and using bone protecting agents when indicated can minimize these risks. Unlike tamoxifen there is no increased risk of blood clots or endometrial cancer. AIs can cause or worsen vaginal dryness but women using these drugs should not use vaginal estrogen preparations for these symptoms. AIs can also cause or increase hot flashes. Any other symptoms should be reported. After this discussion, she is willing to try tamoxifen 20 mg daily, rx in. Follow-up after early breast cancer was discussed. I recommend follow-up as defined by the American Society of Clinical Oncology and [facility]. This includes a visit to a health care professional every 3-6 months for 3 years, then every 6-12 months for 2 years, and then yearly as well as mammograms yearly. She is moving to [place] and will f/u with someone at [facility]    We discussed that exercise is important for breast cancer survivors and that the current recommendations are 150 min of moderate exercise weekly or for 75 min of vigorous exercise weekly. Also, the [address] recommends eating 2.5 cups of fruits and vegetables daily, not smoking, and drinking no more than one glass of alcohol daily for women (and two for men). 2. Emotional well being:  She has excellent support and is coping well with her disease              I appreciate the opportunity to participate in Ms. Indira Beebe's care.     Signed By: Rosario Day MD

## 2018-06-06 ENCOUNTER — DOCUMENTATION ONLY (OUTPATIENT)
Dept: ONCOLOGY | Age: 63
End: 2018-06-06

## 2018-06-06 ENCOUNTER — TELEPHONE (OUTPATIENT)
Dept: ONCOLOGY | Age: 63
End: 2018-06-06

## 2018-06-06 NOTE — TELEPHONE ENCOUNTER
Unable to reach her today, but left message describing what is included in the survivorship care plan and encouraging her to call me with any questions she has once she has reviewed the information or to set up a more formal opportunity to go over this and receive additional resources--in a clinic visit or by phone  Left my contact phone #s in message for her return call now or when she has received the packet.

## 2018-06-06 NOTE — PROGRESS NOTES
[address] [facility] Medical Oncology  [address] [id number] [facility]. [address] [facility], [address]     DCIS Survivorship Care Plan    GENERAL INFORMATION    NAME/AGE/GENDER: Fide Lamb is a 61 y.o. female who was born on 1955. 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 Total Risk Prognosis (10 Year Total Recurrence Risk) with breast conserving surgery (lumpectomy) alone--9% risk; with breast conserving surgery and radiation--9% risk. Jenny Ennis was developed in a combined population of [phone] patients diagnosed with DCIS. The 10 year Total recurrence risk for patients treated with breast conserving surgery (BCS) when classified as Low Risk is 8% [95%CI 1% - 14%] or classified as Elevated Risk is 25% [95%CI 16% - 34%]. The 10 year Total recurrence risk for patients treated with BCS and radiation therapy (RT) when classified as Low Risk is 9% [95%CI 2% - 15%] or classified as Elevated Risk is 13% [95%CI 7% - 18%].     FOLLOW-UP CARE PLAN    Cancer Surveillance or Other Recommended Related Tests        Name of test  When/How often Ordering Provider   Mammogram Once a year Your new surgeon or medical oncologist   Pelvic exam Once a year, while you are taking Tamoxifen Your new PCP or gynecologist   Eye exam Once a year, while you are taking Tamoxifen Your new eye specialist     Please continue to see your primary care provider for all general health care recommended for a woman your age, including cancer screening tests.     Possible late and long-term effects that someone with this type of cancer and treatment may experience: hot flashes, vaginal dryness, vaginal discharge, blood clots (rarely), uterine cancer (rarely) and acceleration in how quickly existing cataracts mature    Schedule of Clinical Visits        Coordinating Provider  When/How often Contact information   Primary Care Provider As needed for non-cancer health care [phone]     Medical Oncologist Once a year while on hormone therapy [phone]     Surgeon Once a year along with medical oncologist if on hormone therapy 21        CANCER TREATMENT SUMMARY    Surgery: Date and procedure/location/findings: 4/3/18 Right lumpectomy, margins clear, no lymph nodes removed    Radiation: No     Persistent symptoms or side effects at completion of treatment: No     Clinical Trial: No     Date of other cancer and/or recurrence of primary cancer and subsequent treatment: none, according to documentation in [address] [place] [address], your [facility] medical record    FAMILIAL CANCER RISK ASSESSMENT    Family history of cancer: according to documentation in [address] [place] [address], your paternal grandmother had stomach cancer    Genetic/hereditary risk factor(s) or predisposing conditions: none apparent  Genetic testing: No      CONTINUING TREATMENT    Need for Ongoing (Adjuvant) Treatment for Cancer: Yes, Tamoxifen 20 mg daily; began 6/2018                Additional treatment name Possible side effects Planned duration   Tamoxifen Hot flashes, vaginal dryness, vaginal bleeding, loss of interest in sex, blood clots (rarely), uterine cancer (rarely), acceleration in how quickly existing cataracts mature Currently recommended for 5 years     DOING YOUR PART AS A CANCER SURVIVOR    Many survivors feel worried or anxious that the cancer will come back after treatment. While it often does not, its important to talk with your doctor about the possibility of the cancer returning. Most breast cancer recurrences are found by patients between visits. Tell your doctor if you notice any of the following symptoms, as they may be signs of a cancer recurrence:    · New lumps in the breast  · Bone pain  · Chest pain  · Abdominal pain  · Shortness of breath or difficulty breathing  · Persistent headaches  · Persistent coughing  · Rash on breast  · Nipple discharge (liquid coming from the nipple)    Or any other symptoms should be brought to the attention of your provider:   1. Anything that represents a brand new symptom   2. Anything that represents a persistent symptom   3. Anything you are worried about that might be related to the cancer coming back    Cancer survivors may experience issues with the areas listed below. If you have any concerns in these or other areas, please speak with your survivorship nurse practitioner or a member of your physician team to find out how to get help with them.     Emotional and mental health, fatigue, weight changes, stopping smoking, physical functioning, insurance, financial advice/assistance, school/work issues, parenting, memory or concentration loss, fertility, sexual functioning, or any other survivorship concerns            General Guidelines for Health and Cancer Prevention/Risk Reduction      · Work to achieve and maintain a healthy weight  · Engage in regular physical activity including:  Aerobic exercise at least 150 minutes per week                            Strength training exercise at least 2 days per week    · Eat a diet that is high in vegetables, fruits, whole grains, legumes (beans) and low in saturated fats (animal fats)    · Quit/do not start using tobacco  · Limit alcohol consumption to no more than 1 drink per day for women/no more than 2 drinks per day for men    If you have any questions or need additional information/support, please discuss these recommendations with your doctor or nurse practitioner.          RESOURCES FOR THE JOURNEY    Breast Cancer Specific:  Living Beyond Breast Cancer www.lbbc.org  Breast Cancer. org NotSimilar.no. [facility] http://ww5.rahda. [address] www.vbcf. org    General:  [employer]iv[employer] www.livestrong. [facility] www.cancer. [address] www.cancer. org  American Society of Clinical Oncology http://ZIMPERIUM.PinMyPet/. net/research-and-advocacy/asco-care-and-treatment-  recommendations-patients/follow-care-breast-cancer  [facility] for [employer] www.Techtium. [facility] for [employer] www.canceradvocaGlobal [employer]. [address] www.nccn. org  [address] www. patientadvocate. org  Cancer and Careers www.cancerandcareers. [address] [place] [address]  Cancer Survivorship www.Bonegrafix. PinMyPet/cancersurvivorship    Prepared By: Mason Mays [address]  [phone] or [phone]  [email or web address]    Delivered on: 6/6/18    This [address] [place] [address] is a cancer treatment summary and follow-up plan provided to you to keep with your healthcare records and to share with your healthcare providers. This summary is a brief record of major aspects of your cancer treatment, not a detailed or comprehensive record of your care.

## 2018-11-07 ENCOUNTER — TELEPHONE (OUTPATIENT)
Dept: ONCOLOGY | Age: 63
End: 2018-11-07

## 2018-11-12 ENCOUNTER — TELEPHONE (OUTPATIENT)
Dept: ONCOLOGY | Age: 63
End: 2018-11-12

## 2018-11-12 DIAGNOSIS — D05.10 DUCTAL CARCINOMA IN SITU (DCIS) OF BREAST, UNSPECIFIED LATERALITY: Primary | ICD-10-CM

## 2018-11-12 NOTE — TELEPHONE ENCOUNTER
11/12/18 4:41 PM: Called patient regarding referral to Vladimir Matamoros. No answer; left voicemail requesting call back.

## 2018-11-12 NOTE — TELEPHONE ENCOUNTER
Patient called and stated that she needs a physician referral to see Dr. Sravanthi Amin #[phone].   Patients call back is [phone]

## 2020-07-15 ENCOUNTER — COMPLETE SKIN EXAM (OUTPATIENT)
Dept: URBAN - METROPOLITAN AREA CLINIC 19 | Facility: CLINIC | Age: 65
Setting detail: DERMATOLOGY
End: 2020-07-15

## 2020-07-15 DIAGNOSIS — L81.4 OTHER MELANIN HYPERPIGMENTATION: ICD-10-CM

## 2020-07-15 DIAGNOSIS — Z86.03 PERSONAL HISTORY OF NEOPLASM OF UNCERTAIN BEHAVIOR: ICD-10-CM

## 2020-07-15 DIAGNOSIS — D22.5 MELANOCYTIC NEVI OF TRUNK: ICD-10-CM

## 2020-07-15 DIAGNOSIS — Z85.828 PERSONAL HISTORY OF OTHER MALIGNANT NEOPLASM OF SKIN: ICD-10-CM

## 2020-07-15 DIAGNOSIS — D18.01 HEMANGIOMA OF SKIN AND SUBCUTANEOUS TISSUE: ICD-10-CM

## 2020-07-15 DIAGNOSIS — Z85.820 PERSONAL HISTORY OF MALIGNANT MELANOMA OF SKIN: ICD-10-CM

## 2020-07-15 DIAGNOSIS — L82.1 OTHER SEBORRHEIC KERATOSIS: ICD-10-CM

## 2020-07-15 DIAGNOSIS — L57.8 OTHER SKIN CHANGES DUE TO CHRONIC EXPOSURE TO NONIONIZING RADIATION: ICD-10-CM

## 2020-07-15 PROCEDURE — 99203 OFFICE O/P NEW LOW 30 MIN: CPT

## 2021-07-26 ENCOUNTER — COMPLETE SKIN EXAM (OUTPATIENT)
Dept: URBAN - METROPOLITAN AREA CLINIC 19 | Facility: CLINIC | Age: 66
Setting detail: DERMATOLOGY
End: 2021-07-26

## 2021-07-26 DIAGNOSIS — L57.0 ACTINIC KERATOSIS: ICD-10-CM

## 2021-07-26 PROBLEM — D18.01 HEMANGIOMA OF SKIN AND SUBCUTANEOUS TISSUE: Status: RESOLVED | Noted: 2021-07-26

## 2021-07-26 PROBLEM — I78.9 DISEASE OF CAPILLARIES, UNSPECIFIED: Status: RESOLVED | Noted: 2021-07-26

## 2021-07-26 PROBLEM — L82.1 OTHER SEBORRHEIC KERATOSIS: Status: RESOLVED | Noted: 2021-07-26

## 2021-07-26 PROCEDURE — 17003 DESTRUCT PREMALG LES 2-14: CPT

## 2021-07-26 PROCEDURE — 17000 DESTRUCT PREMALG LESION: CPT

## 2021-07-26 PROCEDURE — 99213 OFFICE O/P EST LOW 20 MIN: CPT

## 2022-05-11 ENCOUNTER — APPOINTMENT (OUTPATIENT)
Dept: URBAN - METROPOLITAN AREA SURGERY 12 | Age: 67
Setting detail: DERMATOLOGY
End: 2022-05-11

## 2022-05-11 DIAGNOSIS — L30.0 NUMMULAR DERMATITIS: ICD-10-CM

## 2022-05-11 PROCEDURE — 99213 OFFICE O/P EST LOW 20 MIN: CPT

## 2022-05-11 PROCEDURE — OTHER COUNSELING: OTHER

## 2022-05-11 PROCEDURE — OTHER PRESCRIPTION MEDICATION MANAGEMENT: OTHER

## 2022-05-11 NOTE — PROCEDURE: PRESCRIPTION MEDICATION MANAGEMENT
Initiate Treatment: Patient recommended to apply Clobetasol 0.025% on the affected area daily and moisturize legs daily with Cetaphil lotion
Detail Level: Zone
Samples Given: Clobetasol 0.025%, Cetaphil Lotion
Render In Strict Bullet Format?: No

## 2022-07-18 ENCOUNTER — APPOINTMENT (OUTPATIENT)
Dept: URBAN - METROPOLITAN AREA SURGERY 12 | Age: 67
Setting detail: DERMATOLOGY
End: 2022-07-18

## 2022-07-18 DIAGNOSIS — L81.4 OTHER MELANIN HYPERPIGMENTATION: ICD-10-CM

## 2022-07-18 DIAGNOSIS — L82.1 OTHER SEBORRHEIC KERATOSIS: ICD-10-CM

## 2022-07-18 DIAGNOSIS — D18.0 HEMANGIOMA: ICD-10-CM

## 2022-07-18 DIAGNOSIS — D22 MELANOCYTIC NEVI: ICD-10-CM

## 2022-07-18 PROBLEM — D18.01 HEMANGIOMA OF SKIN AND SUBCUTANEOUS TISSUE: Status: ACTIVE | Noted: 2022-07-18

## 2022-07-18 PROBLEM — D22.5 MELANOCYTIC NEVI OF TRUNK: Status: ACTIVE | Noted: 2022-07-18

## 2022-07-18 PROCEDURE — OTHER COUNSELING: OTHER

## 2022-07-18 PROCEDURE — 99213 OFFICE O/P EST LOW 20 MIN: CPT

## 2022-07-18 ASSESSMENT — LOCATION ZONE DERM
LOCATION ZONE: NECK
LOCATION ZONE: TRUNK

## 2022-07-18 ASSESSMENT — LOCATION SIMPLE DESCRIPTION DERM
LOCATION SIMPLE: RIGHT ANTERIOR NECK
LOCATION SIMPLE: ABDOMEN
LOCATION SIMPLE: RIGHT UPPER BACK

## 2022-07-18 ASSESSMENT — LOCATION DETAILED DESCRIPTION DERM
LOCATION DETAILED: RIGHT MEDIAL UPPER BACK
LOCATION DETAILED: EPIGASTRIC SKIN
LOCATION DETAILED: RIGHT SUPERIOR UPPER BACK
LOCATION DETAILED: RIGHT INFERIOR ANTERIOR NECK

## 2023-08-03 ENCOUNTER — APPOINTMENT (OUTPATIENT)
Dept: URBAN - METROPOLITAN AREA SURGERY 12 | Age: 68
Setting detail: DERMATOLOGY
End: 2023-08-03

## 2023-08-03 DIAGNOSIS — L57.0 ACTINIC KERATOSIS: ICD-10-CM

## 2023-08-03 DIAGNOSIS — L82.1 OTHER SEBORRHEIC KERATOSIS: ICD-10-CM

## 2023-08-03 DIAGNOSIS — L73.8 OTHER SPECIFIED FOLLICULAR DISORDERS: ICD-10-CM

## 2023-08-03 DIAGNOSIS — L81.4 OTHER MELANIN HYPERPIGMENTATION: ICD-10-CM

## 2023-08-03 DIAGNOSIS — D22 MELANOCYTIC NEVI: ICD-10-CM

## 2023-08-03 DIAGNOSIS — D18.0 HEMANGIOMA: ICD-10-CM

## 2023-08-03 DIAGNOSIS — Z71.89 OTHER SPECIFIED COUNSELING: ICD-10-CM

## 2023-08-03 PROBLEM — D18.01 HEMANGIOMA OF SKIN AND SUBCUTANEOUS TISSUE: Status: ACTIVE | Noted: 2023-08-03

## 2023-08-03 PROBLEM — D22.5 MELANOCYTIC NEVI OF TRUNK: Status: ACTIVE | Noted: 2023-08-03

## 2023-08-03 PROBLEM — D48.5 NEOPLASM OF UNCERTAIN BEHAVIOR OF SKIN: Status: ACTIVE | Noted: 2023-08-03

## 2023-08-03 PROCEDURE — 99213 OFFICE O/P EST LOW 20 MIN: CPT | Mod: 25

## 2023-08-03 PROCEDURE — OTHER BIOPSY BY SHAVE METHOD: OTHER

## 2023-08-03 PROCEDURE — OTHER REASSURANCE: OTHER

## 2023-08-03 PROCEDURE — 11102 TANGNTL BX SKIN SINGLE LES: CPT

## 2023-08-03 PROCEDURE — OTHER SUNSCREEN RECOMMENDATIONS: OTHER

## 2023-08-03 PROCEDURE — OTHER COUNSELING: OTHER

## 2023-08-03 ASSESSMENT — LOCATION DETAILED DESCRIPTION DERM
LOCATION DETAILED: RIGHT MID-UPPER BACK
LOCATION DETAILED: LEFT MID-UPPER BACK
LOCATION DETAILED: LEFT MEDIAL FOREHEAD
LOCATION DETAILED: LEFT SUPERIOR FOREHEAD
LOCATION DETAILED: LEFT MEDIAL FOREHEAD
LOCATION DETAILED: RIGHT INFERIOR CENTRAL MALAR CHEEK
LOCATION DETAILED: RIGHT SUPERIOR UPPER BACK

## 2023-08-03 ASSESSMENT — LOCATION SIMPLE DESCRIPTION DERM
LOCATION SIMPLE: RIGHT CHEEK
LOCATION SIMPLE: RIGHT UPPER BACK
LOCATION SIMPLE: LEFT UPPER BACK
LOCATION SIMPLE: LEFT FOREHEAD
LOCATION SIMPLE: LEFT FOREHEAD

## 2023-08-03 ASSESSMENT — LOCATION ZONE DERM
LOCATION ZONE: FACE
LOCATION ZONE: TRUNK
LOCATION ZONE: FACE

## 2023-08-03 NOTE — HPI: OTHER
Condition:: Spots of concern
Please Describe Your Condition:: Pt has felt raised spots on the hairline

## 2023-09-07 ENCOUNTER — APPOINTMENT (OUTPATIENT)
Dept: URBAN - METROPOLITAN AREA SURGERY 12 | Age: 68
Setting detail: DERMATOLOGY
End: 2023-09-07

## 2023-09-07 DIAGNOSIS — L57.0 ACTINIC KERATOSIS: ICD-10-CM

## 2023-09-07 PROCEDURE — OTHER COUNSELING: OTHER

## 2023-09-07 PROCEDURE — OTHER LIQUID NITROGEN: OTHER

## 2023-09-07 PROCEDURE — 17000 DESTRUCT PREMALG LESION: CPT

## 2023-09-07 PROCEDURE — OTHER MIPS QUALITY: OTHER

## 2023-09-07 ASSESSMENT — LOCATION SIMPLE DESCRIPTION DERM: LOCATION SIMPLE: RIGHT CHEEK

## 2023-09-07 ASSESSMENT — LOCATION DETAILED DESCRIPTION DERM: LOCATION DETAILED: RIGHT INFERIOR CENTRAL MALAR CHEEK

## 2023-09-07 ASSESSMENT — LOCATION ZONE DERM: LOCATION ZONE: FACE

## 2023-09-07 NOTE — PROCEDURE: COUNSELING
Patient Specific Counseling (Will Not Stick From Patient To Patient): Path proven AK on the right inferior central malar cheek treated with LN2 today
Detail Level: Detailed

## 2023-09-07 NOTE — PROCEDURE: MIPS QUALITY
Quality 111:Pneumonia Vaccination Status For Older Adults: Patient did not receive any pneumococcal conjugate or polysaccharide vaccine on or after their 60th birthday and before the end of the measurement period
Quality 47: Advance Care Plan: Advance Care Planning discussed and documented; advance care plan or surrogate decision maker documented in the medical record.
Detail Level: Detailed

## 2023-09-07 NOTE — PROCEDURE: LIQUID NITROGEN
Show Applicator Variable?: Yes
Consent: The patient's consent was obtained including but not limited to risks of crusting, scabbing, blistering, scarring, darker or lighter pigmentary change, recurrence, incomplete removal and infection.
Render Note In Bullet Format When Appropriate: No
Duration Of Freeze Thaw-Cycle (Seconds): 5
Post-Care Instructions: I reviewed with the patient in detail post-care instructions. Patient is to wear sunprotection, and avoid picking at any of the treated lesions. Pt may apply Vaseline to crusted or scabbing areas.
Number Of Freeze-Thaw Cycles: 1 freeze-thaw cycle
Detail Level: Detailed

## 2024-08-08 ENCOUNTER — APPOINTMENT (OUTPATIENT)
Dept: URBAN - METROPOLITAN AREA SURGERY 12 | Age: 69
Setting detail: DERMATOLOGY
End: 2024-08-08

## 2024-08-08 DIAGNOSIS — D18.0 HEMANGIOMA: ICD-10-CM

## 2024-08-08 DIAGNOSIS — L84 CORNS AND CALLOSITIES: ICD-10-CM

## 2024-08-08 DIAGNOSIS — L81.4 OTHER MELANIN HYPERPIGMENTATION: ICD-10-CM

## 2024-08-08 DIAGNOSIS — L82.1 OTHER SEBORRHEIC KERATOSIS: ICD-10-CM

## 2024-08-08 DIAGNOSIS — D22 MELANOCYTIC NEVI: ICD-10-CM

## 2024-08-08 DIAGNOSIS — L72.0 EPIDERMAL CYST: ICD-10-CM

## 2024-08-08 PROBLEM — D22.5 MELANOCYTIC NEVI OF TRUNK: Status: ACTIVE | Noted: 2024-08-08

## 2024-08-08 PROBLEM — D18.01 HEMANGIOMA OF SKIN AND SUBCUTANEOUS TISSUE: Status: ACTIVE | Noted: 2024-08-08

## 2024-08-08 PROCEDURE — OTHER COUNSELING: OTHER

## 2024-08-08 PROCEDURE — OTHER MIPS QUALITY: OTHER

## 2024-08-08 PROCEDURE — 99213 OFFICE O/P EST LOW 20 MIN: CPT

## 2024-08-08 ASSESSMENT — LOCATION DETAILED DESCRIPTION DERM
LOCATION DETAILED: RIGHT INFERIOR LATERAL FOREHEAD
LOCATION DETAILED: LEFT DORSAL GREAT TOE
LOCATION DETAILED: INFERIOR THORACIC SPINE
LOCATION DETAILED: SUPERIOR THORACIC SPINE

## 2024-08-08 ASSESSMENT — LOCATION SIMPLE DESCRIPTION DERM
LOCATION SIMPLE: UPPER BACK
LOCATION SIMPLE: RIGHT FOREHEAD
LOCATION SIMPLE: LEFT GREAT TOE

## 2024-08-08 ASSESSMENT — LOCATION ZONE DERM
LOCATION ZONE: FACE
LOCATION ZONE: TRUNK
LOCATION ZONE: TOE

## (undated) DEVICE — LIGHT HANDLE: Brand: DEVON

## (undated) DEVICE — CHEST PACK: Brand: MEDLINE INDUSTRIES, INC.

## (undated) DEVICE — INSULATED BLADE ELECTRODE: Brand: EDGE

## (undated) DEVICE — DERMABOND SKIN ADH 0.7ML -- DERMABOND ADVANCED 12/BX

## (undated) DEVICE — THE MARKER IS A RADIOGRAPHIC IMPLANTABLE MARKER USED TO MARK SOFT TISSUE.IT IS COMPRISED OF A BIOABSORBABLE SPACER THAT HOLDS RADIOPAQUE MARKER CLIPS.: Brand: BIOZORB MARKER

## (undated) DEVICE — KENDALL SCD EXPRESS SLEEVES, KNEE LENGTH, MEDIUM: Brand: KENDALL SCD

## (undated) DEVICE — SUTURE MCRYL SZ 4-0 L27IN ABSRB UD L19MM PS-2 1/2 CIR PRIM Y426H

## (undated) DEVICE — SUT SLK 2-0SH 30IN BLK --

## (undated) DEVICE — DEVICE TRNSF SPIK STL 2008S] MICROTEK MEDICAL INC]

## (undated) DEVICE — ROCKER SWITCH PENCIL BLADE ELECTRODE, HOLSTER: Brand: EDGE

## (undated) DEVICE — SOLUTION IV 1000ML 0.9% SOD CHL

## (undated) DEVICE — NEEDLE HYPO 22GA L1.5IN BLK S STL HUB POLYPR SHLD REG BVL

## (undated) DEVICE — INFECTION CONTROL KIT SYS

## (undated) DEVICE — SUTURE VCRL SZ 3-0 L27IN ABSRB UD L26MM SH 1/2 CIR J416H

## (undated) DEVICE — DEVON™ KNEE AND BODY STRAP 60" X 3" (1.5 M X 7.6 CM): Brand: DEVON

## (undated) DEVICE — STERILE POLYISOPRENE POWDER-FREE SURGICAL GLOVES: Brand: PROTEXIS